# Patient Record
Sex: MALE | Race: BLACK OR AFRICAN AMERICAN | NOT HISPANIC OR LATINO | Employment: OTHER | URBAN - METROPOLITAN AREA
[De-identification: names, ages, dates, MRNs, and addresses within clinical notes are randomized per-mention and may not be internally consistent; named-entity substitution may affect disease eponyms.]

---

## 2017-12-28 ENCOUNTER — HOSPITAL ENCOUNTER (INPATIENT)
Facility: HOSPITAL | Age: 72
LOS: 1 days | Discharge: HOME | DRG: 291 | End: 2017-12-30
Attending: EMERGENCY MEDICINE | Admitting: HOSPITALIST
Payer: MEDICARE

## 2017-12-28 DIAGNOSIS — N18.6 ESRD (END STAGE RENAL DISEASE): ICD-10-CM

## 2017-12-28 DIAGNOSIS — I10 ESSENTIAL HYPERTENSION: Chronic | ICD-10-CM

## 2017-12-28 DIAGNOSIS — J10.1 INFLUENZA A: Primary | ICD-10-CM

## 2017-12-28 DIAGNOSIS — R06.02 SOB (SHORTNESS OF BREATH): ICD-10-CM

## 2017-12-28 DIAGNOSIS — I50.23 ACUTE ON CHRONIC SYSTOLIC HEART FAILURE: ICD-10-CM

## 2017-12-28 DIAGNOSIS — R09.02 HYPOXEMIA: ICD-10-CM

## 2017-12-28 DIAGNOSIS — I50.23 SYSTOLIC CHF, ACUTE ON CHRONIC: ICD-10-CM

## 2017-12-28 DIAGNOSIS — J90 PLEURAL EFFUSION, BILATERAL: ICD-10-CM

## 2017-12-28 LAB
ALBUMIN SERPL BCP-MCNC: 2.8 G/DL
ALP SERPL-CCNC: 67 U/L
ALT SERPL W/O P-5'-P-CCNC: 36 U/L
ANION GAP SERPL CALC-SCNC: 15 MMOL/L
AST SERPL-CCNC: 55 U/L
BASOPHILS # BLD AUTO: 0.03 K/UL
BASOPHILS NFR BLD: 0.4 %
BILIRUB SERPL-MCNC: 0.5 MG/DL
BNP SERPL-MCNC: 3654 PG/ML
BUN SERPL-MCNC: 29 MG/DL
CALCIUM SERPL-MCNC: 9.2 MG/DL
CHLORIDE SERPL-SCNC: 97 MMOL/L
CO2 SERPL-SCNC: 25 MMOL/L
CREAT SERPL-MCNC: 7.9 MG/DL
DIFFERENTIAL METHOD: ABNORMAL
EOSINOPHIL # BLD AUTO: 0 K/UL
EOSINOPHIL NFR BLD: 0.5 %
ERYTHROCYTE [DISTWIDTH] IN BLOOD BY AUTOMATED COUNT: 19.4 %
EST. GFR  (AFRICAN AMERICAN): 7 ML/MIN/1.73 M^2
EST. GFR  (NON AFRICAN AMERICAN): 6 ML/MIN/1.73 M^2
FLUAV AG SPEC QL IA: POSITIVE
FLUBV AG SPEC QL IA: NEGATIVE
GLUCOSE SERPL-MCNC: 181 MG/DL
HCT VFR BLD AUTO: 30.1 %
HGB BLD-MCNC: 9.6 G/DL
LYMPHOCYTES # BLD AUTO: 1.4 K/UL
LYMPHOCYTES NFR BLD: 19.1 %
MCH RBC QN AUTO: 29.9 PG
MCHC RBC AUTO-ENTMCNC: 31.9 G/DL
MCV RBC AUTO: 94 FL
MONOCYTES # BLD AUTO: 0.5 K/UL
MONOCYTES NFR BLD: 7.1 %
NEUTROPHILS # BLD AUTO: 5.4 K/UL
NEUTROPHILS NFR BLD: 72.9 %
PLATELET # BLD AUTO: 158 K/UL
PMV BLD AUTO: 10.8 FL
POTASSIUM SERPL-SCNC: 3.8 MMOL/L
PROT SERPL-MCNC: 7.1 G/DL
RBC # BLD AUTO: 3.21 M/UL
SODIUM SERPL-SCNC: 137 MMOL/L
SPECIMEN SOURCE: ABNORMAL
TROPONIN I SERPL DL<=0.01 NG/ML-MCNC: 0.9 NG/ML
WBC # BLD AUTO: 7.44 K/UL

## 2017-12-28 PROCEDURE — 83880 ASSAY OF NATRIURETIC PEPTIDE: CPT

## 2017-12-28 PROCEDURE — 84484 ASSAY OF TROPONIN QUANT: CPT

## 2017-12-28 PROCEDURE — 99284 EMERGENCY DEPT VISIT MOD MDM: CPT | Mod: 25

## 2017-12-28 PROCEDURE — G0378 HOSPITAL OBSERVATION PER HR: HCPCS

## 2017-12-28 PROCEDURE — 94640 AIRWAY INHALATION TREATMENT: CPT

## 2017-12-28 PROCEDURE — 87400 INFLUENZA A/B EACH AG IA: CPT | Mod: 59

## 2017-12-28 PROCEDURE — 93010 ELECTROCARDIOGRAM REPORT: CPT | Mod: ,,, | Performed by: INTERNAL MEDICINE

## 2017-12-28 PROCEDURE — 80053 COMPREHEN METABOLIC PANEL: CPT

## 2017-12-28 PROCEDURE — 93005 ELECTROCARDIOGRAM TRACING: CPT

## 2017-12-28 PROCEDURE — 25000003 PHARM REV CODE 250: Performed by: EMERGENCY MEDICINE

## 2017-12-28 PROCEDURE — 25000242 PHARM REV CODE 250 ALT 637 W/ HCPCS: Performed by: EMERGENCY MEDICINE

## 2017-12-28 PROCEDURE — 85025 COMPLETE CBC W/AUTO DIFF WBC: CPT

## 2017-12-28 RX ORDER — OSELTAMIVIR PHOSPHATE 75 MG/1
75 CAPSULE ORAL
Status: COMPLETED | OUTPATIENT
Start: 2017-12-28 | End: 2017-12-29

## 2017-12-28 RX ORDER — ACETAMINOPHEN 500 MG
1000 TABLET ORAL
Status: COMPLETED | OUTPATIENT
Start: 2017-12-28 | End: 2017-12-28

## 2017-12-28 RX ORDER — IPRATROPIUM BROMIDE AND ALBUTEROL SULFATE 2.5; .5 MG/3ML; MG/3ML
3 SOLUTION RESPIRATORY (INHALATION)
Status: COMPLETED | OUTPATIENT
Start: 2017-12-28 | End: 2017-12-28

## 2017-12-28 RX ADMIN — ACETAMINOPHEN 1000 MG: 500 TABLET, FILM COATED ORAL at 10:12

## 2017-12-28 RX ADMIN — IPRATROPIUM BROMIDE AND ALBUTEROL SULFATE 3 ML: .5; 3 SOLUTION RESPIRATORY (INHALATION) at 09:12

## 2017-12-29 PROBLEM — J18.9 PNEUMONIA: Status: ACTIVE | Noted: 2017-12-29

## 2017-12-29 PROBLEM — I50.23 ACUTE ON CHRONIC SYSTOLIC HEART FAILURE: Status: ACTIVE | Noted: 2017-12-28

## 2017-12-29 PROBLEM — I10 ESSENTIAL HYPERTENSION: Chronic | Status: ACTIVE | Noted: 2017-12-29

## 2017-12-29 PROBLEM — N18.6 ESRD (END STAGE RENAL DISEASE): Chronic | Status: ACTIVE | Noted: 2017-12-29

## 2017-12-29 PROBLEM — R06.03 ACUTE RESPIRATORY DISTRESS: Status: ACTIVE | Noted: 2017-12-28

## 2017-12-29 PROBLEM — J96.01 ACUTE RESPIRATORY FAILURE WITH HYPOXIA: Status: ACTIVE | Noted: 2017-12-29

## 2017-12-29 LAB
ANION GAP SERPL CALC-SCNC: 12 MMOL/L
BASOPHILS # BLD AUTO: 0.02 K/UL
BASOPHILS NFR BLD: 0.3 %
BUN SERPL-MCNC: 31 MG/DL
CALCIUM SERPL-MCNC: 8.9 MG/DL
CHLORIDE SERPL-SCNC: 97 MMOL/L
CO2 SERPL-SCNC: 29 MMOL/L
CREAT SERPL-MCNC: 8.4 MG/DL
DIFFERENTIAL METHOD: ABNORMAL
EOSINOPHIL # BLD AUTO: 0.1 K/UL
EOSINOPHIL NFR BLD: 1 %
ERYTHROCYTE [DISTWIDTH] IN BLOOD BY AUTOMATED COUNT: 19.2 %
EST. GFR  (AFRICAN AMERICAN): 7 ML/MIN/1.73 M^2
EST. GFR  (NON AFRICAN AMERICAN): 6 ML/MIN/1.73 M^2
ESTIMATED AVG GLUCOSE: 209 MG/DL
GLUCOSE SERPL-MCNC: 208 MG/DL
HBA1C MFR BLD HPLC: 8.9 %
HCT VFR BLD AUTO: 27.6 %
HGB BLD-MCNC: 8.6 G/DL
LACTATE SERPL-SCNC: 1.8 MMOL/L
LYMPHOCYTES # BLD AUTO: 1.2 K/UL
LYMPHOCYTES NFR BLD: 20.9 %
MAGNESIUM SERPL-MCNC: 2 MG/DL
MCH RBC QN AUTO: 29.5 PG
MCHC RBC AUTO-ENTMCNC: 31.2 G/DL
MCV RBC AUTO: 95 FL
MONOCYTES # BLD AUTO: 0.4 K/UL
MONOCYTES NFR BLD: 6 %
NEUTROPHILS # BLD AUTO: 4.2 K/UL
NEUTROPHILS NFR BLD: 71.8 %
PHOSPHATE SERPL-MCNC: 4.5 MG/DL
PLATELET # BLD AUTO: 153 K/UL
PMV BLD AUTO: 10.9 FL
POCT GLUCOSE: 207 MG/DL (ref 70–110)
POCT GLUCOSE: 214 MG/DL (ref 70–110)
POCT GLUCOSE: 235 MG/DL (ref 70–110)
POCT GLUCOSE: 289 MG/DL (ref 70–110)
POTASSIUM SERPL-SCNC: 3.7 MMOL/L
RBC # BLD AUTO: 2.92 M/UL
SODIUM SERPL-SCNC: 138 MMOL/L
TROPONIN I SERPL DL<=0.01 NG/ML-MCNC: 0.97 NG/ML
WBC # BLD AUTO: 5.88 K/UL

## 2017-12-29 PROCEDURE — 85025 COMPLETE CBC W/AUTO DIFF WBC: CPT

## 2017-12-29 PROCEDURE — 90937 HEMODIALYSIS REPEATED EVAL: CPT | Mod: ,,, | Performed by: INTERNAL MEDICINE

## 2017-12-29 PROCEDURE — 83605 ASSAY OF LACTIC ACID: CPT

## 2017-12-29 PROCEDURE — 84484 ASSAY OF TROPONIN QUANT: CPT

## 2017-12-29 PROCEDURE — 63600175 PHARM REV CODE 636 W HCPCS: Performed by: NURSE PRACTITIONER

## 2017-12-29 PROCEDURE — 25000003 PHARM REV CODE 250: Performed by: EMERGENCY MEDICINE

## 2017-12-29 PROCEDURE — 63600175 PHARM REV CODE 636 W HCPCS: Performed by: INTERNAL MEDICINE

## 2017-12-29 PROCEDURE — 80048 BASIC METABOLIC PNL TOTAL CA: CPT

## 2017-12-29 PROCEDURE — 25000003 PHARM REV CODE 250: Performed by: INTERNAL MEDICINE

## 2017-12-29 PROCEDURE — 25000003 PHARM REV CODE 250: Performed by: NURSE PRACTITIONER

## 2017-12-29 PROCEDURE — 36415 COLL VENOUS BLD VENIPUNCTURE: CPT

## 2017-12-29 PROCEDURE — C8929 TTE W OR WO FOL WCON,DOPPLER: HCPCS

## 2017-12-29 PROCEDURE — 80100016 HC MAINTENANCE HEMODIALYSIS

## 2017-12-29 PROCEDURE — 93306 TTE W/DOPPLER COMPLETE: CPT | Mod: 26,,, | Performed by: INTERNAL MEDICINE

## 2017-12-29 PROCEDURE — 87040 BLOOD CULTURE FOR BACTERIA: CPT | Mod: 59

## 2017-12-29 PROCEDURE — 84100 ASSAY OF PHOSPHORUS: CPT

## 2017-12-29 PROCEDURE — 83735 ASSAY OF MAGNESIUM: CPT

## 2017-12-29 PROCEDURE — 99223 1ST HOSP IP/OBS HIGH 75: CPT | Mod: 25,,, | Performed by: INTERNAL MEDICINE

## 2017-12-29 PROCEDURE — 94640 AIRWAY INHALATION TREATMENT: CPT

## 2017-12-29 PROCEDURE — 11000001 HC ACUTE MED/SURG PRIVATE ROOM

## 2017-12-29 PROCEDURE — 83036 HEMOGLOBIN GLYCOSYLATED A1C: CPT

## 2017-12-29 RX ORDER — FUROSEMIDE 10 MG/ML
60 INJECTION INTRAMUSCULAR; INTRAVENOUS ONCE
Status: COMPLETED | OUTPATIENT
Start: 2017-12-29 | End: 2017-12-29

## 2017-12-29 RX ORDER — ATORVASTATIN CALCIUM 20 MG/1
20 TABLET, FILM COATED ORAL DAILY
COMMUNITY

## 2017-12-29 RX ORDER — NAPROXEN SODIUM 220 MG/1
81 TABLET, FILM COATED ORAL DAILY
COMMUNITY

## 2017-12-29 RX ORDER — IBUPROFEN 200 MG
16 TABLET ORAL
Status: DISCONTINUED | OUTPATIENT
Start: 2017-12-29 | End: 2017-12-30 | Stop reason: HOSPADM

## 2017-12-29 RX ORDER — CINACALCET 60 MG/1
60 TABLET, FILM COATED ORAL
COMMUNITY
End: 2018-07-30 | Stop reason: SDUPTHER

## 2017-12-29 RX ORDER — ACETAMINOPHEN 325 MG/1
650 TABLET ORAL EVERY 6 HOURS PRN
Status: DISCONTINUED | OUTPATIENT
Start: 2017-12-29 | End: 2017-12-30 | Stop reason: HOSPADM

## 2017-12-29 RX ORDER — HEPARIN SODIUM 5000 [USP'U]/ML
5000 INJECTION, SOLUTION INTRAVENOUS; SUBCUTANEOUS EVERY 8 HOURS
Status: DISCONTINUED | OUTPATIENT
Start: 2017-12-29 | End: 2017-12-30 | Stop reason: HOSPADM

## 2017-12-29 RX ORDER — FUROSEMIDE 10 MG/ML
60 INJECTION INTRAMUSCULAR; INTRAVENOUS 2 TIMES DAILY
Status: DISCONTINUED | OUTPATIENT
Start: 2017-12-29 | End: 2017-12-29

## 2017-12-29 RX ORDER — IPRATROPIUM BROMIDE AND ALBUTEROL SULFATE 2.5; .5 MG/3ML; MG/3ML
3 SOLUTION RESPIRATORY (INHALATION) EVERY 8 HOURS
Status: COMPLETED | OUTPATIENT
Start: 2017-12-30 | End: 2017-12-29

## 2017-12-29 RX ORDER — GLUCAGON 1 MG
1 KIT INJECTION
Status: DISCONTINUED | OUTPATIENT
Start: 2017-12-29 | End: 2017-12-30 | Stop reason: HOSPADM

## 2017-12-29 RX ORDER — PANTOPRAZOLE SODIUM 40 MG/1
40 TABLET, DELAYED RELEASE ORAL DAILY
Status: DISCONTINUED | OUTPATIENT
Start: 2017-12-29 | End: 2017-12-30 | Stop reason: HOSPADM

## 2017-12-29 RX ORDER — PROMETHAZINE HYDROCHLORIDE AND CODEINE PHOSPHATE 6.25; 1 MG/5ML; MG/5ML
5 SOLUTION ORAL EVERY 8 HOURS PRN
Status: DISCONTINUED | OUTPATIENT
Start: 2017-12-29 | End: 2017-12-30 | Stop reason: HOSPADM

## 2017-12-29 RX ORDER — IBUPROFEN 200 MG
24 TABLET ORAL
Status: DISCONTINUED | OUTPATIENT
Start: 2017-12-29 | End: 2017-12-30 | Stop reason: HOSPADM

## 2017-12-29 RX ORDER — DIPHENHYDRAMINE HCL 25 MG
25 CAPSULE ORAL EVERY 6 HOURS PRN
Status: DISCONTINUED | OUTPATIENT
Start: 2017-12-29 | End: 2017-12-29

## 2017-12-29 RX ORDER — NAPROXEN SODIUM 220 MG/1
81 TABLET, FILM COATED ORAL DAILY
Status: DISCONTINUED | OUTPATIENT
Start: 2017-12-29 | End: 2017-12-30 | Stop reason: HOSPADM

## 2017-12-29 RX ORDER — HEPARIN SODIUM 1000 [USP'U]/ML
3000 INJECTION, SOLUTION INTRAVENOUS; SUBCUTANEOUS ONCE
Status: COMPLETED | OUTPATIENT
Start: 2017-12-29 | End: 2017-12-29

## 2017-12-29 RX ORDER — ALBUMIN HUMAN 250 G/1000ML
12.5 SOLUTION INTRAVENOUS
Status: DISCONTINUED | OUTPATIENT
Start: 2017-12-29 | End: 2017-12-30 | Stop reason: HOSPADM

## 2017-12-29 RX ORDER — CALCITRIOL 0.25 UG/1
0.25 CAPSULE ORAL DAILY
Status: DISCONTINUED | OUTPATIENT
Start: 2017-12-29 | End: 2017-12-30 | Stop reason: HOSPADM

## 2017-12-29 RX ORDER — OSELTAMIVIR PHOSPHATE 75 MG/1
75 CAPSULE ORAL DAILY
Status: DISCONTINUED | OUTPATIENT
Start: 2017-12-30 | End: 2017-12-30 | Stop reason: HOSPADM

## 2017-12-29 RX ORDER — FUROSEMIDE 40 MG/1
40 TABLET ORAL 2 TIMES DAILY
COMMUNITY

## 2017-12-29 RX ORDER — TEMAZEPAM 30 MG/1
30 CAPSULE ORAL NIGHTLY PRN
COMMUNITY

## 2017-12-29 RX ORDER — RAMELTEON 8 MG/1
8 TABLET ORAL NIGHTLY PRN
Status: DISCONTINUED | OUTPATIENT
Start: 2017-12-29 | End: 2017-12-30 | Stop reason: HOSPADM

## 2017-12-29 RX ORDER — CALCITRIOL 0.25 UG/1
0.25 CAPSULE ORAL DAILY
COMMUNITY

## 2017-12-29 RX ORDER — METOPROLOL SUCCINATE 25 MG/1
25 TABLET, EXTENDED RELEASE ORAL DAILY
Status: DISCONTINUED | OUTPATIENT
Start: 2017-12-29 | End: 2017-12-30 | Stop reason: HOSPADM

## 2017-12-29 RX ORDER — METOPROLOL SUCCINATE 25 MG/1
25 TABLET, EXTENDED RELEASE ORAL DAILY
COMMUNITY

## 2017-12-29 RX ORDER — SEVELAMER CARBONATE 800 MG/1
1600 TABLET, FILM COATED ORAL ONCE
Status: COMPLETED | OUTPATIENT
Start: 2017-12-29 | End: 2017-12-29

## 2017-12-29 RX ORDER — ATORVASTATIN CALCIUM 10 MG/1
20 TABLET, FILM COATED ORAL NIGHTLY
Status: DISCONTINUED | OUTPATIENT
Start: 2017-12-29 | End: 2017-12-30 | Stop reason: HOSPADM

## 2017-12-29 RX ORDER — FLUTICASONE PROPIONATE 50 MCG
1 SPRAY, SUSPENSION (ML) NASAL DAILY
COMMUNITY

## 2017-12-29 RX ORDER — FUROSEMIDE 40 MG/1
40 TABLET ORAL 2 TIMES DAILY
Status: DISCONTINUED | OUTPATIENT
Start: 2017-12-29 | End: 2017-12-29

## 2017-12-29 RX ORDER — CINACALCET 30 MG/1
60 TABLET, FILM COATED ORAL
Status: DISCONTINUED | OUTPATIENT
Start: 2017-12-29 | End: 2017-12-30 | Stop reason: HOSPADM

## 2017-12-29 RX ORDER — MIDODRINE HYDROCHLORIDE 5 MG/1
2.5 TABLET ORAL 2 TIMES DAILY WITH MEALS
COMMUNITY

## 2017-12-29 RX ORDER — PANTOPRAZOLE SODIUM 40 MG/1
40 TABLET, DELAYED RELEASE ORAL DAILY
COMMUNITY

## 2017-12-29 RX ORDER — SEVELAMER HYDROCHLORIDE 800 MG/1
800 TABLET, FILM COATED ORAL
COMMUNITY

## 2017-12-29 RX ORDER — DIPHENHYDRAMINE HCL 50 MG
50 CAPSULE ORAL NIGHTLY PRN
Status: DISCONTINUED | OUTPATIENT
Start: 2017-12-29 | End: 2017-12-30 | Stop reason: HOSPADM

## 2017-12-29 RX ORDER — LISINOPRIL 5 MG/1
5 TABLET ORAL DAILY
Status: DISCONTINUED | OUTPATIENT
Start: 2017-12-29 | End: 2017-12-30 | Stop reason: HOSPADM

## 2017-12-29 RX ORDER — SEVELAMER CARBONATE 800 MG/1
800 TABLET, FILM COATED ORAL
Status: DISCONTINUED | OUTPATIENT
Start: 2017-12-29 | End: 2017-12-30 | Stop reason: HOSPADM

## 2017-12-29 RX ORDER — ONDANSETRON 2 MG/ML
4 INJECTION INTRAMUSCULAR; INTRAVENOUS EVERY 6 HOURS PRN
Status: DISCONTINUED | OUTPATIENT
Start: 2017-12-29 | End: 2017-12-30 | Stop reason: HOSPADM

## 2017-12-29 RX ORDER — AZITHROMYCIN 250 MG/1
500 TABLET, FILM COATED ORAL DAILY
Status: DISCONTINUED | OUTPATIENT
Start: 2017-12-29 | End: 2017-12-29

## 2017-12-29 RX ORDER — INSULIN ASPART 100 [IU]/ML
0-5 INJECTION, SOLUTION INTRAVENOUS; SUBCUTANEOUS
Status: DISCONTINUED | OUTPATIENT
Start: 2017-12-29 | End: 2017-12-30 | Stop reason: HOSPADM

## 2017-12-29 RX ADMIN — ERYTHROPOIETIN 10000 UNITS: 10000 INJECTION, SOLUTION INTRAVENOUS; SUBCUTANEOUS at 06:12

## 2017-12-29 RX ADMIN — ATORVASTATIN CALCIUM 20 MG: 10 TABLET, FILM COATED ORAL at 10:12

## 2017-12-29 RX ADMIN — SEVELAMER CARBONATE 800 MG: 800 TABLET, FILM COATED ORAL at 12:12

## 2017-12-29 RX ADMIN — SEVELAMER CARBONATE 800 MG: 800 TABLET, FILM COATED ORAL at 05:12

## 2017-12-29 RX ADMIN — HEPARIN SODIUM 3000 UNITS: 1000 INJECTION, SOLUTION INTRAVENOUS; SUBCUTANEOUS at 06:12

## 2017-12-29 RX ADMIN — AZITHROMYCIN MONOHYDRATE 500 MG: 500 INJECTION, POWDER, LYOPHILIZED, FOR SOLUTION INTRAVENOUS at 11:12

## 2017-12-29 RX ADMIN — INSULIN ASPART 3 UNITS: 100 INJECTION, SOLUTION INTRAVENOUS; SUBCUTANEOUS at 10:12

## 2017-12-29 RX ADMIN — SEVELAMER CARBONATE 800 MG: 800 TABLET, FILM COATED ORAL at 08:12

## 2017-12-29 RX ADMIN — CEFTRIAXONE SODIUM 1 G: 1 INJECTION, POWDER, FOR SOLUTION INTRAMUSCULAR; INTRAVENOUS at 11:12

## 2017-12-29 RX ADMIN — INSULIN ASPART 2 UNITS: 100 INJECTION, SOLUTION INTRAVENOUS; SUBCUTANEOUS at 05:12

## 2017-12-29 RX ADMIN — HEPARIN SODIUM 5000 UNITS: 5000 INJECTION, SOLUTION INTRAVENOUS; SUBCUTANEOUS at 05:12

## 2017-12-29 RX ADMIN — IPRATROPIUM BROMIDE AND ALBUTEROL SULFATE 3 ML: .5; 3 SOLUTION RESPIRATORY (INHALATION) at 11:12

## 2017-12-29 RX ADMIN — CINACALCET HYDROCHLORIDE 60 MG: 30 TABLET, COATED ORAL at 08:12

## 2017-12-29 RX ADMIN — INSULIN ASPART 1 UNITS: 100 INJECTION, SOLUTION INTRAVENOUS; SUBCUTANEOUS at 02:12

## 2017-12-29 RX ADMIN — SEVELAMER CARBONATE 1600 MG: 800 TABLET, FILM COATED ORAL at 10:12

## 2017-12-29 RX ADMIN — DIPHENHYDRAMINE HYDROCHLORIDE 50 MG: 50 CAPSULE ORAL at 10:12

## 2017-12-29 RX ADMIN — OSELTAMIVIR PHOSPHATE 75 MG: 75 CAPSULE ORAL at 12:12

## 2017-12-29 RX ADMIN — CALCITRIOL 0.25 MCG: 0.25 CAPSULE, LIQUID FILLED ORAL at 08:12

## 2017-12-29 RX ADMIN — FUROSEMIDE 60 MG: 10 INJECTION, SOLUTION INTRAMUSCULAR; INTRAVENOUS at 02:12

## 2017-12-29 RX ADMIN — LISINOPRIL 5 MG: 5 TABLET ORAL at 05:12

## 2017-12-29 RX ADMIN — PANTOPRAZOLE SODIUM 40 MG: 40 TABLET, DELAYED RELEASE ORAL at 02:12

## 2017-12-29 RX ADMIN — ATORVASTATIN CALCIUM 20 MG: 10 TABLET, FILM COATED ORAL at 02:12

## 2017-12-29 RX ADMIN — GUAIFENESIN AND DEXTROMETHORPHAN HYDROBROMIDE 1 TABLET: 600; 30 TABLET, EXTENDED RELEASE ORAL at 08:12

## 2017-12-29 RX ADMIN — GUAIFENESIN AND DEXTROMETHORPHAN HYDROBROMIDE 1 TABLET: 600; 30 TABLET, EXTENDED RELEASE ORAL at 02:12

## 2017-12-29 RX ADMIN — GUAIFENESIN AND DEXTROMETHORPHAN HYDROBROMIDE 1 TABLET: 600; 30 TABLET, EXTENDED RELEASE ORAL at 10:12

## 2017-12-29 RX ADMIN — RAMELTEON 8 MG: 8 TABLET, FILM COATED ORAL at 10:12

## 2017-12-29 RX ADMIN — METOPROLOL SUCCINATE 25 MG: 25 TABLET, EXTENDED RELEASE ORAL at 02:12

## 2017-12-29 NOTE — PROGRESS NOTES
[facility] Hospital Medicine  Progress Note    Patient Name: James Kennedy  MRN: [id number]  Patient Class: IP- Inpatient   Admission Date: 12/28/2017  Length of Stay: 0 days  Attending Physician: Ashish Barry MD  Primary Care Provider: [facility]        Subjective:     Principal Problem:Acute on chronic systolic heart failure    HPI:  James Kennedy is a 72 y.o. male patient with PMHx of CHF who presents to the Emergency Department for SOB which onset gradually 2 weeks ago. Patient reports being [facility] Z[facility] on [facility]/17 for URI sxs. Symptoms are constant and moderate in severity. No mitigating or exacerbating factors reported. Associated sxs include chest tightness, fever, and chills. Patient reports fever and chills onset yesterday after dialysis. Patient reports having dialysis on Mondays, Wednesdays, and Fridays. Patient's nephrologist is Dr. Rea. Patient denies any CP, diaphoresis, palpitations, extremity weakness/numbness, leg swelling, abd pain, hematochezia, dizziness, cough, N/V, and all other sxs at this time. Patient reports hx of using cigarettes, but states he has not smoked in the last 3 weeks.  Initial work-up in ED resulted cr. 7.9, BUN 29, K 3.8, BNP 3654, troponin 0.899, influenza A positive.  CXR showed pulmonary vascular congestion, small to moderate right and small left pleural effusions.  Hospital Medicine was consulted for admission.    Hospital Course:  The pt was admitted with Influenza A, bilateral pneumonia, and decompensated systolic CHF EF 20%. Pt ws placed on Tamiflu, Iv antibiotics. He was given IV lasix and will receive ultrafiltration with HD. Pt has been compliant with meds and HD treatments. Care discussed with nephrology. Pt feels much more SOB than baseline- unable to recline past 45 degrees. Fever resolved. WBC normal.         Review of Systems   Constitutional: Positive for fatigue. Negative for appetite  change, chills, diaphoresis and fever.   HENT: Negative for congestion, nosebleeds, sore throat and trouble swallowing.    Eyes: Negative for pain, discharge and visual disturbance.   Respiratory: Positive for cough, chest tightness, shortness of breath and wheezing. Negative for apnea and stridor.         Orthopnea    Cardiovascular: Negative for chest pain, palpitations and leg swelling.   Gastrointestinal: Negative for abdominal distention, abdominal pain, blood in stool, constipation, diarrhea, nausea and vomiting.   Endocrine: Negative for cold intolerance and heat intolerance.   Genitourinary: Negative for difficulty urinating, dysuria, flank pain, frequency and urgency.   Musculoskeletal: Negative for arthralgias, back pain, joint swelling, myalgias, neck pain and neck stiffness.   Skin: Negative for rash and wound.   Allergic/Immunologic: Negative for food allergies and immunocompromised state.   Neurological: Negative for dizziness, seizures, syncope, facial asymmetry, weakness, light-headedness and headaches.   Hematological: Negative for adenopathy.   Psychiatric/Behavioral: Negative for agitation, behavioral problems and confusion. The patient is not nervous/anxious.      Objective:     Vital Signs (Most Recent):  Temp: 98.8 °F (37.1 °C) (12/29/17 1521)  Pulse: 80 (12/29/17 1521)  Resp: 18 (12/29/17 1521)  BP: 124/72 (12/29/17 1521)  SpO2: 99 % (12/29/17 1521) Vital Signs (24h Range):  Temp:  [97.9 °F (36.6 °C)-102.2 °F (39 °C)] 98.8 °F (37.1 °C)  Pulse:  [] 80  Resp:  [18-30] 18  SpO2:  [92 %-100 %] 99 %  BP: (110-163)/(56-75) 124/72     Weight: 99.8 kg (220 lb)  Body mass index is 34.46 kg/m².  No intake or output data in the 24 hours ending 12/29/17 1740   Physical Exam   Constitutional: He is oriented to person, place, and time. No distress.   Frail, elderly    HENT:   Head: Normocephalic and atraumatic.   Nose: Nose normal.   Mouth/Throat: Oropharynx is clear and moist.   Eyes: Conjunctivae  are normal. No scleral icterus.   Neck: Normal range of motion. Neck supple. JVD present.   Cardiovascular: Normal rate, regular rhythm, normal heart sounds and intact distal pulses.  Exam reveals no gallop and no friction rub.    No murmur heard.  Pulmonary/Chest: Effort normal. No stridor. No respiratory distress. He has wheezes. He has rales. He exhibits no tenderness.   Crackles bilaterally   Conversational dyspnea    Abdominal: Soft. Bowel sounds are normal. He exhibits no distension. There is no tenderness. There is no rebound and no guarding.   Musculoskeletal: Normal range of motion. He exhibits no edema, tenderness or deformity.   Neurological: He is alert and oriented to person, place, and time. He has normal reflexes. No cranial nerve deficit. He exhibits normal muscle tone. Coordination normal.   Skin: Skin is warm and dry. No rash noted. He is not diaphoretic. No erythema. No pallor.   Psychiatric: He has a normal mood and affect. His behavior is normal. Thought content normal.   Nursing note and vitals reviewed.      Significant Labs:   CBC:   Recent Labs  Lab 12/28/17  2159 12/29/17  0424   WBC 7.44 5.88   HGB 9.6* 8.6*   HCT 30.1* 27.6*    153     CMP:   Recent Labs  Lab 12/28/17  2242 12/29/17  0424    138   K 3.8 3.7   CL 97 97   CO2 25 29   * 208*   BUN 29* 31*   CREATININE 7.9* 8.4*   CALCIUM 9.2 8.9   PROT 7.1  --    ALBUMIN 2.8*  --    BILITOT 0.5  --    ALKPHOS 67  --    AST 55*  --    ALT 36  --    ANIONGAP 15 12   EGFRNONAA 6* 6*       Significant Imaging:   Imaging Results          X-Ray Chest AP Portable (Final result)  Result time 12/28/17 22:26:58    Final result by Naz Perkins MD (12/28/17 22:26:58)                 Impression:     Small-to-moderate right and small left pleural effusions with bibasilar patchy opacities, probably atelectasis, although infiltrate not excluded.          Electronically signed by: NAZ PERKINS  Date:     12/28/17  Time:    22:26               Narrative:    Chest x-ray single view    Indication: Shortness of breath.    Findings: No prior study.  Status post cardiothoracic surgery with sternal wires.  Normal size heart.  No vascular congestion.  Bilateral pleural effusions, right greater than left, blunting the costophrenic angles extend along the lower chest sidewalls.  Patchy opacities bibasilar distributions of likely superimposed atelectasis.  Infiltrate not excluded.  Upper lungs are clear.                            Assessment/Plan:      * Acute on chronic systolic HFrEF of 20%     IV Lasix 60mg x 1 dose given  Care discussed with Nephrology- UF with  HD.  - Strict I&O's, daily weights, Na/fluid restriction.  - Supplemental O2 and Duonebs as needed.  - Continue BB for systolic dysfunction.  - Will add lisinopril         Acute respiratory failure with hypoxia    Oxygen  Home oxygen eval in am          Influenza A    - Tamiflu renal dose  - Supportive care.     Pneumonia  IV antibiotics  Neb txs  Blood and sputum culture pending      Essential hypertension    - BP stable.  - Continue home beta blocker.  - Monitor BP trends.        ESRD (end stage renal disease) on HD    - Receives HD M/W/F.  HD for UF  Monitor BMP          VTE Risk Mitigation         Ordered     heparin (porcine) injection 3,000 Units  Once     Route:  Intravenous        12/29/17 0856     heparin (porcine) injection 5,000 Units  Every 8 hours     Route:  Subcutaneous        12/29/17 0149     Medium Risk of VTE  Once      12/29/17 0106     Place sequential compression device  Until discontinued      12/29/17 0106              Susan Lima NP  Department of Hospital Medicine   [facility]

## 2017-12-29 NOTE — CONSULTS
[facility]   Nephrology  Consult Note    Patient Name: James Kennedy  MRN: [id number]  Admission Date: 12/28/2017  Hospital Length of Stay: 0 days  Attending Provider: Ashish Barry MD   Primary Care Physician: [facility]  Principal Problem:Acute on chronic systolic heart failure. ESRD on HD    Consults  Subjective:     HPI: Pt was seen and examined. H/p was reviewed. Pt is a 71 y/o male with ESRD on chronic HD q MWF who was admitted with SOB since yesterday and subjective fever. Pt is positive for influenza A. But he also has evidence of fluid gain due to CHD exacerbation. Outside echo shows EF of 20%. HD orders discussed with HD nurse. Mgmt reviewed with PCP. Pt has been on HD x 4 years, and usually does not miss HD.    Past Medical History:   Diagnosis Date    CHF (congestive heart failure)     GERD (gastroesophageal reflux disease)     Hypertension     MI, old     Renal disorder        Past Surgical History:   Procedure Laterality Date    BYPASS GRAFT      CARDIAC SURGERY      CHOLECYSTECTOMY      DIALYSIS FISTULA CREATION         Review of patient's allergies indicates:  No Known Allergies  Current Facility-Administered Medications   Medication Frequency    acetaminophen tablet 650 mg Q6H PRN    albumin human 25% bottle 12.5 g PRN    aspirin chewable tablet 81 mg Daily    atorvastatin tablet 20 mg QHS    azithromycin 500 mg in dextrose 5 % 250 mL IVPB (ready to mix system) Q24H    calcitRIOL capsule 0.25 mcg Daily    cefTRIAXone (ROCEPHIN) 1 g in dextrose 5 % 50 mL IVPB Q24H    cinacalcet tablet 60 mg Daily with breakfast    dextromethorphan-guaifenesin  mg per 12 hr tablet 1 tablet BID    dextrose 50% injection 12.5 g PRN    dextrose 50% injection 25 g PRN    diphenhydrAMINE capsule 25 mg Q6H PRN    epoetin lorraine injection 10,000 Units Once    glucagon (human recombinant) injection 1 mg PRN    glucose chewable tablet 16 g PRN    glucose  chewable tablet 24 g PRN    heparin (porcine) injection 3,000 Units Once    heparin (porcine) injection 5,000 Units Q8H    insulin aspart pen 0-5 Units QID (AC + HS) PRN    metoprolol succinate (TOPROL-XL) 24 hr tablet 25 mg Daily    ondansetron injection 4 mg Q6H PRN    [START ON 12/30/2017] oseltamivir capsule 75 mg Daily    pantoprazole EC tablet 40 mg Daily    promethazine-codeine 6.25-10 mg/5 ml syrup 5 mL Q8H PRN    ramelteon tablet 8 mg Nightly PRN    sevelamer carbonate tablet 800 mg TID WM     Family History     None        Social History Main Topics    Smoking status: Former Smoker    Smokeless tobacco: Not on file    Alcohol use No    Drug use: No    Sexual activity: Not on file     Review of Systems   Constitutional: Positive for activity change and fatigue. Negative for appetite change.   HENT: Negative.    Respiratory: Positive for shortness of breath.    Cardiovascular: Negative.    Gastrointestinal: Negative.    Genitourinary: Negative.    Musculoskeletal: Negative.    Neurological: Negative.    Psychiatric/Behavioral: Negative.      Objective:     Vital Signs (Most Recent):  Temp: 98.8 °F (37.1 °C) (12/29/17 1521)  Pulse: 80 (12/29/17 1521)  Resp: 18 (12/29/17 1521)  BP: 124/72 (12/29/17 1521)  SpO2: 99 % (12/29/17 1521)  O2 Device (Oxygen Therapy): nasal cannula (12/29/17 1521) Vital Signs (24h Range):  Temp:  [97.9 °F (36.6 °C)-102.2 °F (39 °C)] 98.8 °F (37.1 °C)  Pulse:  [] 80  Resp:  [18-30] 18  SpO2:  [92 %-100 %] 99 %  BP: (110-163)/(56-75) 124/72     Weight: 99.8 kg (220 lb) (12/29/17 0433)  Body mass index is 34.46 kg/m².  Body surface area is 2.17 meters squared.    No intake/output data recorded.    Physical Exam   Constitutional: He is oriented to person, place, and time. He appears well-developed and well-nourished.   HENT:   Head: Normocephalic and atraumatic.   Neck: No JVD present.   Cardiovascular: Normal rate, regular rhythm and normal heart sounds.  Exam  reveals no friction rub.    Pulmonary/Chest: Effort normal. He has rales.   Abdominal: Soft. Bowel sounds are normal.   Musculoskeletal: He exhibits edema.   Neurological: He is alert and oriented to person, place, and time.   Skin: Skin is warm and dry.   Psychiatric: He has a normal mood and affect. His behavior is normal.   Nursing note and vitals reviewed.      Significant Labs: reviewed  BMP  Lab Results   Component Value Date     12/29/2017    K 3.7 12/29/2017    CL 97 12/29/2017    CO2 29 12/29/2017    BUN 31 (H) 12/29/2017    CREATININE 8.4 (H) 12/29/2017    CALCIUM 8.9 12/29/2017    ANIONGAP 12 12/29/2017    ESTGFRAFRICA 7 (A) 12/29/2017    EGFRNONAA 6 (A) 12/29/2017     Lab Results   Component Value Date    WBC 5.88 12/29/2017    HGB 8.6 (L) 12/29/2017    HCT 27.6 (L) 12/29/2017    MCV 95 12/29/2017     12/29/2017     Significant Imaging: CXR reviewed    Assessment/Plan:   73 y/o male with ESRD on chronic HD was admitted for SOB:    ESRD (end stage renal disease) on HD    ESRD: chronic HD q MWF.  K normal  Acid base stable  Pt has fluid overload, pulmonary edema  Providing HD today for UF  Has been stable on HD, has not missed  SOB due to pulm edema  SOB is also due to influenza A       * Acute on chronic systolic HFrEF of 20%    Acute on chronic CHF.  Systolic CHF with EF 25%  Providing HD for UF today  Pt was advised on salt (2-3 g/day) and fluid restriction (1.5 L/day)                  Essential hypertension    HTN: BP controlled.  Meds reviewed        Influenza A    Positive influenza A.  On tamiflu: adjust dose to 75 mg po qd (not bid) for renal dose adjustment.        Plans and recommendations:  As discussed above  Total time spent 70 minutes including time needed to review the records, the   patient evaluation, documentation, face-to-face discussion with the patient,   more than 50% of the time was spent on coordination of care and counseling.    Level V visit.  Pt received multiple  visits and evaluations.      Ant Gerard MD  Nephrology  [facility]

## 2017-12-29 NOTE — ED PROVIDER NOTES
SCRIBE #1 NOTE: I, Mojgan Baker, am scribing for, and in the presence of, Johnathan Silver Jr., MD. I have scribed the entire note.      History      Chief Complaint   Patient presents with    Shortness of Breath     shorntess of breath, chest pain, coughing and wheezing; dialysis M/W/F       Review of patient's allergies indicates:  No Known Allergies     HPI   HPI    12/28/2017, 9:47 PM   History obtained from the patient      History of Present Illness: James Kennedy is a 72 y.o. male patient with PMHx of CHF who presents to the Emergency Department for SOB which onset gradually 2 weeks ago. Patient reports being [facility] for URI sxs. Symptoms are constant and moderate in severity. No mitigating or exacerbating factors reported. Associated sxs include chest tightness, fever, and chills. Patient reports fever and chills onset yesterday after dialysis. Patient reports having dialysis on Mondays, Wednesdays, and Fridays. Patient's nephrologist is Dr. Rea. Patient denies any CP, diaphoresis, palpitations, extremity weakness/numbness, leg swelling, abd pain, hematochezia, dizziness, cough, N/V, and all other sxs at this time. Patient reports hx of using cigarettes, but states he has not smoked in the last 3 weeks. No further complaints or concerns at this time.     Arrival mode: Personal vehicle     [facility]       Past Medical History:  Past Medical History:   Diagnosis Date    CHF (congestive heart failure)     GERD (gastroesophageal reflux disease)     Hypertension     MI, old     Renal disorder        Past Surgical History:  Past Surgical History:   Procedure Laterality Date    BYPASS GRAFT      CARDIAC SURGERY      CHOLECYSTECTOMY      DIALYSIS FISTULA CREATION           Family History:  History reviewed. No pertinent family history.    Social History:  Social History     Social History Main Topics    Smoking status: Former Smoker     Smokeless tobacco: Unknown    Alcohol use No    Drug use: No    Sexual activity: Unknown       ROS   Review of Systems   Constitutional: Positive for chills and fever. Negative for diaphoresis.   HENT: Negative for sore throat.    Respiratory: Positive for chest tightness and shortness of breath. Negative for cough.    Cardiovascular: Negative for chest pain, palpitations and leg swelling.   Gastrointestinal: Negative for abdominal pain, blood in stool, nausea and vomiting.   Genitourinary: Negative for dysuria.   Musculoskeletal: Negative for back pain.   Skin: Negative for rash.   Neurological: Negative for dizziness, weakness and numbness.   Hematological: Does not bruise/bleed easily.   All other systems reviewed and are negative.      Physical Exam      Initial Vitals [12/28/17 2124]   BP Pulse Resp Temp SpO2   (!) 163/75 100 (!) 24 (!) 102.2 °F (39 °C) (!) 92 %      MAP       104.33          Physical Exam  Nursing Notes and Vital Signs Reviewed.  Constitutional: Patient is in no acute distress. Well-developed and well-nourished.  Head: Atraumatic. Normocephalic.  Eyes: PERRL. EOM intact. Conjunctivae are not pale. No scleral icterus.  ENT: Mucous membranes are moist. Oropharynx is clear and symmetric.    Neck: Supple. Full ROM. No lymphadenopathy.  Cardiovascular: Regular rate. Regular rhythm. No murmurs, rubs, or gallops. Distal pulses are 2+ and symmetric.  Pulmonary/Chest: No respiratory distress. Crackles bilaterally. Mild wheezing bilaterally that has improved with breathing treatment. No rales.  Abdominal: Soft and non-distended.  There is no tenderness.  No rebound, guarding, or rigidity. Good bowel sounds.  Genitourinary: No CVA tenderness  Musculoskeletal: Moves all extremities. No obvious deformities. No edema. No calf tenderness.  Skin: Warm and dry.  Neurological:  Alert, awake, and appropriate.  Normal speech.  No acute focal neurological deficits are appreciated.  Psychiatric: Normal  affect. Good eye contact. Appropriate in content.    ED Course    Procedures  ED Vital Signs:  Vitals:    12/29/17 1930 12/29/17 2000 12/29/17 2004 12/29/17 2030   BP: (!) 104/56 110/62 115/65 (!) 105/59   Pulse: 78 82 78 78   Resp: 18 20 18 18   Temp:   99.9 °F (37.7 °C)    TempSrc:   Oral    SpO2:       Weight:       Height:        12/29/17 2100 12/29/17 2125 12/29/17 2130 12/29/17 2334   BP: (!) 115/57 118/69 90/66 131/60   Pulse: 82 65 88 94   Resp: 18 20 20 20   Temp:    100.2 °F (37.9 °C)   TempSrc:       SpO2:    95%   Weight:       Height:        12/29/17 2353 12/30/17 0339 12/30/17 0736 12/30/17 0912   BP:  (!) 110/56 125/62    Pulse: 94 78 76 70   Resp: 20 20 18    Temp:  98.9 °F (37.2 °C) 98.2 °F (36.8 °C)    TempSrc:   Oral    SpO2: 96% 96% 99% 99%   Weight:  101.6 kg (224 lb)     Height:        12/30/17 1105 12/30/17 1133 12/30/17 1309   BP:  136/64    Pulse: 72 74 70   Resp:  18    Temp:  98.6 °F (37 °C)    TempSrc:  Oral    SpO2:  98%    Weight:      Height:          Abnormal Lab Results:  Labs Reviewed   CBC W/ AUTO DIFFERENTIAL - Abnormal; Notable for the following:        Result Value    RBC 3.21 (*)     Hemoglobin 9.6 (*)     Hematocrit 30.1 (*)     MCHC 31.9 (*)     RDW 19.4 (*)     All other components within normal limits   B-TYPE NATRIURETIC PEPTIDE - Abnormal; Notable for the following:     BNP 3,654 (*)     All other components within normal limits   INFLUENZA A AND B ANTIGEN - Abnormal; Notable for the following:     Influenza A Ag, EIA Positive (*)     All other components within normal limits   COMPREHENSIVE METABOLIC PANEL - Abnormal; Notable for the following:     Glucose 181 (*)     BUN, Bld 29 (*)     Creatinine 7.9 (*)     Albumin 2.8 (*)     AST 55 (*)     eGFR if  7 (*)     eGFR if non  6 (*)     All other components within normal limits   TROPONIN I - Abnormal; Notable for the following:     Troponin I 0.899 (*)     All other components within  normal limits        All Lab Results:  Results for orders placed or performed during the hospital encounter of 12/28/17   Blood culture   Result Value Ref Range    Blood Culture, Routine No Growth to date     Blood Culture, Routine No Growth to date     Blood Culture, Routine No Growth to date     Blood Culture, Routine No Growth to date    Blood culture   Result Value Ref Range    Blood Culture, Routine No Growth to date     Blood Culture, Routine No Growth to date     Blood Culture, Routine No Growth to date     Blood Culture, Routine No Growth to date    Culture, Respiratory with Gram Stain   Result Value Ref Range    Respiratory Culture Normal respiratory quirino     Gram Stain (Respiratory) <10 epithelial cells per low power field.     Gram Stain (Respiratory) Few WBC's     Gram Stain (Respiratory) Moderate Gram positive cocci     Gram Stain (Respiratory) Rare Gram negative rods    CBC auto differential   Result Value Ref Range    WBC 7.44 3.90 - 12.70 K/uL    RBC 3.21 (L) 4.60 - 6.20 M/uL    Hemoglobin 9.6 (L) 14.0 - 18.0 g/dL    Hematocrit 30.1 (L) 40.0 - 54.0 %    MCV 94 82 - 98 fL    MCH 29.9 27.0 - 31.0 pg    MCHC 31.9 (L) 32.0 - 36.0 g/dL    RDW 19.4 (H) 11.5 - 14.5 %    Platelets 158 150 - 350 K/uL    MPV 10.8 9.2 - 12.9 fL    Gran # 5.4 1.8 - 7.7 K/uL    Lymph # 1.4 1.0 - 4.8 K/uL    Mono # 0.5 0.3 - 1.0 K/uL    Eos # 0.0 0.0 - 0.5 K/uL    Baso # 0.03 0.00 - 0.20 K/uL    Gran% 72.9 38.0 - 73.0 %    Lymph% 19.1 18.0 - 48.0 %    Mono% 7.1 4.0 - 15.0 %    Eosinophil% 0.5 0.0 - 8.0 %    Basophil% 0.4 0.0 - 1.9 %    Differential Method Automated    B-Type natriuretic peptide (BNP)   Result Value Ref Range    BNP 3,654 (H) 0 - 99 pg/mL   Influenza antigen Nasopharyngeal Swab   Result Value Ref Range    Influenza A Ag, EIA Positive (A) Negative    Influenza B Ag, EIA Negative Negative    Flu A & B Source Nasopharyngeal Swab    Comprehensive metabolic panel   Result Value Ref Range    Sodium 137 136 - 145 mmol/L     Potassium 3.8 3.5 - 5.1 mmol/L    Chloride 97 95 - 110 mmol/L    CO2 25 23 - 29 mmol/L    Glucose 181 (H) 70 - 110 mg/dL    BUN, Bld 29 (H) 8 - 23 mg/dL    Creatinine 7.9 (H) 0.5 - 1.4 mg/dL    Calcium 9.2 8.7 - 10.5 mg/dL    Total Protein 7.1 6.0 - 8.4 g/dL    Albumin 2.8 (L) 3.5 - 5.2 g/dL    Total Bilirubin 0.5 0.1 - 1.0 mg/dL    Alkaline Phosphatase 67 55 - 135 U/L    AST 55 (H) 10 - 40 U/L    ALT 36 10 - 44 U/L    Anion Gap 15 8 - 16 mmol/L    eGFR if African American 7 (A) >60 mL/min/1.73 m^2    eGFR if non African American 6 (A) >60 mL/min/1.73 m^2   Troponin I   Result Value Ref Range    Troponin I 0.899 (H) 0.000 - 0.026 ng/mL   CBC auto differential   Result Value Ref Range    WBC 5.88 3.90 - 12.70 K/uL    RBC 2.92 (L) 4.60 - 6.20 M/uL    Hemoglobin 8.6 (L) 14.0 - 18.0 g/dL    Hematocrit 27.6 (L) 40.0 - 54.0 %    MCV 95 82 - 98 fL    MCH 29.5 27.0 - 31.0 pg    MCHC 31.2 (L) 32.0 - 36.0 g/dL    RDW 19.2 (H) 11.5 - 14.5 %    Platelets 153 150 - 350 K/uL    MPV 10.9 9.2 - 12.9 fL    Gran # 4.2 1.8 - 7.7 K/uL    Lymph # 1.2 1.0 - 4.8 K/uL    Mono # 0.4 0.3 - 1.0 K/uL    Eos # 0.1 0.0 - 0.5 K/uL    Baso # 0.02 0.00 - 0.20 K/uL    Gran% 71.8 38.0 - 73.0 %    Lymph% 20.9 18.0 - 48.0 %    Mono% 6.0 4.0 - 15.0 %    Eosinophil% 1.0 0.0 - 8.0 %    Basophil% 0.3 0.0 - 1.9 %    Differential Method Automated    Basic metabolic panel   Result Value Ref Range    Sodium 138 136 - 145 mmol/L    Potassium 3.7 3.5 - 5.1 mmol/L    Chloride 97 95 - 110 mmol/L    CO2 29 23 - 29 mmol/L    Glucose 208 (H) 70 - 110 mg/dL    BUN, Bld 31 (H) 8 - 23 mg/dL    Creatinine 8.4 (H) 0.5 - 1.4 mg/dL    Calcium 8.9 8.7 - 10.5 mg/dL    Anion Gap 12 8 - 16 mmol/L    eGFR if African American 7 (A) >60 mL/min/1.73 m^2    eGFR if non African American 6 (A) >60 mL/min/1.73 m^2   Magnesium   Result Value Ref Range    Magnesium 2.0 1.6 - 2.6 mg/dL   Phosphorus   Result Value Ref Range    Phosphorus 4.5 2.7 - 4.5 mg/dL   Hemoglobin A1c   Result  Value Ref Range    Hemoglobin A1C 8.9 (H) 4.0 - 5.6 %    Estimated Avg Glucose 209 (H) 68 - 131 mg/dL   Troponin I   Result Value Ref Range    Troponin I 0.968 (H) 0.000 - 0.026 ng/mL   Lactic acid, plasma   Result Value Ref Range    Lactate (Lactic Acid) 1.8 0.5 - 2.2 mmol/L   CBC auto differential   Result Value Ref Range    WBC 5.91 3.90 - 12.70 K/uL    RBC 2.95 (L) 4.60 - 6.20 M/uL    Hemoglobin 8.7 (L) 14.0 - 18.0 g/dL    Hematocrit 27.9 (L) 40.0 - 54.0 %    MCV 95 82 - 98 fL    MCH 29.5 27.0 - 31.0 pg    MCHC 31.2 (L) 32.0 - 36.0 g/dL    RDW 19.1 (H) 11.5 - 14.5 %    Platelets 133 (L) 150 - 350 K/uL    MPV 11.4 9.2 - 12.9 fL    Gran # 4.6 1.8 - 7.7 K/uL    Lymph # 0.9 (L) 1.0 - 4.8 K/uL    Mono # 0.4 0.3 - 1.0 K/uL    Eos # 0.0 0.0 - 0.5 K/uL    Baso # 0.03 0.00 - 0.20 K/uL    Gran% 77.5 (H) 38.0 - 73.0 %    Lymph% 14.4 (L) 18.0 - 48.0 %    Mono% 6.9 4.0 - 15.0 %    Eosinophil% 0.7 0.0 - 8.0 %    Basophil% 0.5 0.0 - 1.9 %    Differential Method Automated    Basic metabolic panel   Result Value Ref Range    Sodium 134 (L) 136 - 145 mmol/L    Potassium 3.9 3.5 - 5.1 mmol/L    Chloride 99 95 - 110 mmol/L    CO2 26 23 - 29 mmol/L    Glucose 221 (H) 70 - 110 mg/dL    BUN, Bld 21 8 - 23 mg/dL    Creatinine 6.4 (H) 0.5 - 1.4 mg/dL    Calcium 8.7 8.7 - 10.5 mg/dL    Anion Gap 9 8 - 16 mmol/L    eGFR if African American 9 (A) >60 mL/min/1.73 m^2    eGFR if non African American 8 (A) >60 mL/min/1.73 m^2   Magnesium   Result Value Ref Range    Magnesium 1.7 1.6 - 2.6 mg/dL   Phosphorus   Result Value Ref Range    Phosphorus 3.2 2.7 - 4.5 mg/dL   Renal function panel   Result Value Ref Range    Glucose 224 (H) 70 - 110 mg/dL    Sodium 135 (L) 136 - 145 mmol/L    Potassium 3.9 3.5 - 5.1 mmol/L    Chloride 98 95 - 110 mmol/L    CO2 25 23 - 29 mmol/L    BUN, Bld 20 8 - 23 mg/dL    Calcium 8.7 8.7 - 10.5 mg/dL    Creatinine 6.4 (H) 0.5 - 1.4 mg/dL    Albumin 2.4 (L) 3.5 - 5.2 g/dL    Phosphorus 3.2 2.7 - 4.5 mg/dL    eGFR if   9 (A) >60 mL/min/1.73 m^2    eGFR if non African American 8 (A) >60 mL/min/1.73 m^2    Anion Gap 12 8 - 16 mmol/L   2D echo with color flow doppler   Result Value Ref Range    EF 25 (A) 55 - 65    Mitral Valve Regurgitation MODERATE (A)     Diastolic Dysfunction Yes (A)     Est. PA Systolic Pressure 80.25 (A)     Tricuspid Valve Regurgitation MODERATE TO SEVERE (A)    POCT glucose   Result Value Ref Range    POCT Glucose 235 (H) 70 - 110 mg/dL   POCT glucose   Result Value Ref Range    POCT Glucose 207 (H) 70 - 110 mg/dL   POCT glucose   Result Value Ref Range    POCT Glucose 289 (H) 70 - 110 mg/dL   POCT glucose   Result Value Ref Range    POCT Glucose 214 (H) 70 - 110 mg/dL   POCT glucose   Result Value Ref Range    POCT Glucose 221 (H) 70 - 110 mg/dL   POCT glucose   Result Value Ref Range    POCT Glucose 244 (H) 70 - 110 mg/dL   POCT glucose   Result Value Ref Range    POCT Glucose 224 (H) 70 - 110 mg/dL       Imaging Results:  Imaging Results          X-Ray Chest PA And Lateral (Final result)  Result time 12/30/17 10:35:12    Final result by Alfonzo Delgado MD (12/30/17 10:35:12)                 Impression:     No change      Electronically signed by: ALFONZO DELGADO MD  Date:     12/30/17  Time:    10:35              Narrative:    History: Shortness of breath    Median sternotomy. Upper normal heart size. No change in bibasilar pleural effusions from 12/28/17. Bilateral lower lung infiltrates/atelectasis.                             X-Ray Chest AP Portable (Final result)  Result time 12/28/17 22:26:58    Final result by Naz Perkins MD (12/28/17 22:26:58)                 Impression:     Small-to-moderate right and small left pleural effusions with bibasilar patchy opacities, probably atelectasis, although infiltrate not excluded.          Electronically signed by: NAZ PERKINS  Date:     12/28/17  Time:    22:26              Narrative:    Chest x-ray single view    Indication: Shortness  of breath.    Findings: No prior study.  Status post cardiothoracic surgery with sternal wires.  Normal size heart.  No vascular congestion.  Bilateral pleural effusions, right greater than left, blunting the costophrenic angles extend along the lower chest sidewalls.  Patchy opacities bibasilar distributions of likely superimposed atelectasis.  Infiltrate not excluded.  Upper lungs are clear.                             The EKG was ordered, reviewed, and independently interpreted by the ED provider.  Interpretation time: 2139  Rate: 97 BPM  Rhythm: normal sinus rhythm  Interpretation: Left axis deviation. RBBB. Left ventricular hypertrophy with repolarization abnoramlity . No STEMI.         The Emergency Provider reviewed the vital signs and test results, which are outlined above.    ED Discussion     11:01 PM: Re-evaluated pt. Pt is resting comfortably and is in no acute distress.  D/w pt all pertinent results. D/w pt any concerns expressed at this time. Answered all questions. Pt expresses understanding at this time.    11:44 PM: Discussed case with Lila Hdz NP ([facility]). Dr. Lai agrees with current care and management of pt and accepts admission.   Admitting Service: [facility] medicine   Admitting Physician: Dr. Lai  Admit to: Obs    12:02 AM: Re-evaluated pt. I have discussed test results, shared treatment plan, and the need for admission with patient and family at bedside. Pt and family express understanding at this time and agree with all information. All questions answered. Pt and family have no further questions or concerns at this time. Pt is ready for admit.        ED Medication(s):  Medications   albuterol-ipratropium 2.5mg-0.5mg/3mL nebulizer solution 3 mL (3 mLs Nebulization Given 12/28/17 2143)   acetaminophen tablet 1,000 mg (1,000 mg Oral Given 12/28/17 2229)   oseltamivir capsule 75 mg (75 mg Oral Given 12/29/17 0037)   furosemide injection 60 mg (60 mg Intravenous Given 12/29/17  0226)   epoetin lorraine injection 10,000 Units (10,000 Units Intravenous Given 12/29/17 1856)   heparin (porcine) injection 3,000 Units (3,000 Units Intravenous Given 12/29/17 1825)   albuterol-ipratropium 2.5mg-0.5mg/3mL nebulizer solution 3 mL (3 mLs Nebulization Given 12/29/17 2058)   sevelamer carbonate tablet 1,600 mg (1,600 mg Oral Given 12/29/17 2251)       Discharge Medication List as of 12/30/2017  1:39 PM          Follow-up Information     [facility] In 1 week.    Contact information:  [address] [facility] [address] [phone]             Ant Gerard MD In 1 week.    Specialty:  Nephrology  Contact information:  [address]  [facility] [address] [phone]                     Medical Decision Making    Medical Decision Making:   Clinical Tests:   Lab Tests: Ordered and Reviewed  Radiological Study: Ordered and Reviewed  Medical Tests: Ordered and Reviewed           Scribe Attestation:   Scribe #1: I performed the above scribed service and the documentation accurately describes the services I performed. I attest to the accuracy of the note.    Attending:   Physician Attestation Statement for Scribe #1: I, Johnathan Silver Jr., MD, personally performed the services described in this documentation, as scribed by Mojgan Baker, in my presence, and it is both accurate and complete.          Clinical Impression       ICD-10-CM ICD-9-CM   1. Influenza A J10.1 487.1   2. SOB (shortness of breath) R06.02 786.05   3. Hypoxemia R09.02 799.02   4. Pleural effusion, bilateral J90 511.9   5. ESRD (end stage renal disease) N18.6 585.6   6. Systolic CHF, acute on chronic I50.23 428.23     428.0   7. Essential hypertension I10 401.9   8. Acute on chronic systolic heart failure I50.23 428.23       Disposition:   Disposition: Placed in Observation  Condition: Sujey Silver Jr., MD  01/02/18 8604

## 2017-12-29 NOTE — ASSESSMENT & PLAN NOTE
- Receives HD M/W/F.  - Nephrology consult in AM for HD.  - Resume home Renal meds.  - Repeat BMP in AM.

## 2017-12-29 NOTE — PLAN OF CARE
Problem: Patient Care Overview  Goal: Plan of Care Review  Outcome: Ongoing (interventions implemented as appropriate)  POC discussed w/patient, verbalized understanding. NSR/ST on monitor. VSS. Voids per BRP. No c/o pain. IV atx administered. Frequent weight change encouraged. Patient turns independently in bed. Fall precautions in place, bed alarm on. Patient denies needs at this time.

## 2017-12-29 NOTE — ASSESSMENT & PLAN NOTE
IV Lasix 60mg x 1 dose given  Care discussed with Nephrology- UF with  HD.  - Strict I&O's, daily weights, Na/fluid restriction.  - Supplemental O2 and Duonebs as needed.  - Continue BB for systolic dysfunction.  - Will add lisinopril

## 2017-12-29 NOTE — SUBJECTIVE & OBJECTIVE
Past Medical History:   Diagnosis Date    CHF (congestive heart failure)     GERD (gastroesophageal reflux disease)     Hypertension     MI, old     Renal disorder        Past Surgical History:   Procedure Laterality Date    BYPASS GRAFT      CARDIAC SURGERY      CHOLECYSTECTOMY      DIALYSIS FISTULA CREATION         Review of patient's allergies indicates:  No Known Allergies  Current Facility-Administered Medications   Medication Frequency    acetaminophen tablet 650 mg Q6H PRN    albumin human 25% bottle 12.5 g PRN    aspirin chewable tablet 81 mg Daily    atorvastatin tablet 20 mg QHS    azithromycin 500 mg in dextrose 5 % 250 mL IVPB (ready to mix system) Q24H    calcitRIOL capsule 0.25 mcg Daily    cefTRIAXone (ROCEPHIN) 1 g in dextrose 5 % 50 mL IVPB Q24H    cinacalcet tablet 60 mg Daily with breakfast    dextromethorphan-guaifenesin  mg per 12 hr tablet 1 tablet BID    dextrose 50% injection 12.5 g PRN    dextrose 50% injection 25 g PRN    diphenhydrAMINE capsule 25 mg Q6H PRN    epoetin lorraine injection 10,000 Units Once    glucagon (human recombinant) injection 1 mg PRN    glucose chewable tablet 16 g PRN    glucose chewable tablet 24 g PRN    heparin (porcine) injection 3,000 Units Once    heparin (porcine) injection 5,000 Units Q8H    insulin aspart pen 0-5 Units QID (AC + HS) PRN    metoprolol succinate (TOPROL-XL) 24 hr tablet 25 mg Daily    ondansetron injection 4 mg Q6H PRN    [START ON 12/30/2017] oseltamivir capsule 75 mg Daily    pantoprazole EC tablet 40 mg Daily    promethazine-codeine 6.25-10 mg/5 ml syrup 5 mL Q8H PRN    ramelteon tablet 8 mg Nightly PRN    sevelamer carbonate tablet 800 mg TID WM     Family History     None        Social History Main Topics    Smoking status: Former Smoker    Smokeless tobacco: Not on file    Alcohol use No    Drug use: No    Sexual activity: Not on file     Review of Systems   Constitutional: Positive for activity  change and fatigue. Negative for appetite change.   HENT: Negative.    Respiratory: Positive for shortness of breath.    Cardiovascular: Negative.    Gastrointestinal: Negative.    Genitourinary: Negative.    Musculoskeletal: Negative.    Neurological: Negative.    Psychiatric/Behavioral: Negative.      Objective:     Vital Signs (Most Recent):  Temp: 98.8 °F (37.1 °C) (12/29/17 1521)  Pulse: 80 (12/29/17 1521)  Resp: 18 (12/29/17 1521)  BP: 124/72 (12/29/17 1521)  SpO2: 99 % (12/29/17 1521)  O2 Device (Oxygen Therapy): nasal cannula (12/29/17 1521) Vital Signs (24h Range):  Temp:  [97.9 °F (36.6 °C)-102.2 °F (39 °C)] 98.8 °F (37.1 °C)  Pulse:  [] 80  Resp:  [18-30] 18  SpO2:  [92 %-100 %] 99 %  BP: (110-163)/(56-75) 124/72     Weight: 99.8 kg (220 lb) (12/29/17 0433)  Body mass index is 34.46 kg/m².  Body surface area is 2.17 meters squared.    No intake/output data recorded.    Physical Exam   Constitutional: He is oriented to person, place, and time. He appears well-developed and well-nourished.   HENT:   Head: Normocephalic and atraumatic.   Neck: No JVD present.   Cardiovascular: Normal rate, regular rhythm and normal heart sounds.  Exam reveals no friction rub.    Pulmonary/Chest: Effort normal. He has rales.   Abdominal: Soft. Bowel sounds are normal.   Musculoskeletal: He exhibits edema.   Neurological: He is alert and oriented to person, place, and time.   Skin: Skin is warm and dry.   Psychiatric: He has a normal mood and affect. His behavior is normal.   Nursing note and vitals reviewed.      Significant Labs: reviewed  BMP  Lab Results   Component Value Date     12/29/2017    K 3.7 12/29/2017    CL 97 12/29/2017    CO2 29 12/29/2017    BUN 31 (H) 12/29/2017    CREATININE 8.4 (H) 12/29/2017    CALCIUM 8.9 12/29/2017    ANIONGAP 12 12/29/2017    ESTGFRAFRICA 7 (A) 12/29/2017    EGFRNONAA 6 (A) 12/29/2017     Lab Results   Component Value Date    WBC 5.88 12/29/2017    HGB 8.6 (L) 12/29/2017     HCT 27.6 (L) 12/29/2017    MCV 95 12/29/2017     12/29/2017     Significant Imaging: CXR reviewed

## 2017-12-29 NOTE — ASSESSMENT & PLAN NOTE
- BNP 3654; CXR shows small to moderate right and small left pleural effusions.  - Will give IV Lasix 60mg x 1 dose now.  - Nephrology consult in AM for HD.  - Strict I&O's, daily weights, Na/fluid restriction.  - Supplemental O2 and Duonebs as needed.  - Continue BB for systolic dysfunction.  - Will add ACEi/ARB as BP allows.

## 2017-12-29 NOTE — HPI
Pt was seen and examined. H/p was reviewed. Pt is a 73 y/o male with ESRD on chronic HD q MWF who was admitted with SOB since yesterday and subjective fever. Pt is positive for influenza A. But he also has evidence of fluid gain due to CHD exacerbation. Outside echo shows EF of 20%. HD orders discussed with HD nurse. Mgmt reviewed with PCP. Pt has been on HD x 4 years, and usually does not miss HD.

## 2017-12-29 NOTE — PROGRESS NOTES
Home Oxygen Evaluation    Date Performed: 2017    1) Patient's Home O2 Sat on room air, while at rest: 93%        If O2 sats on room air at rest are 88% or below, patient qualifies. No additional testing needed. Document N/A in steps 2 and 3. If 89% or above, complete steps 2.      2) Patient's O2 Sat on room air while exercisin%        If O2 sats on room air while exercising remain 89% or above patient does not qualify, no further testing needed Document N/A in step 3. If O2 sats on room air while exercising are 88% or below, continue to step 3.      3) Patient's O2 Sat while exercising on O2: 98% at 2 LPM         (Must show improvement from #2 for patients to qualify)    If O2 sats improve on oxygen, patient qualifies for portable oxygen. If not, the patient does not qualify.

## 2017-12-29 NOTE — SUBJECTIVE & OBJECTIVE
Past Medical History:   Diagnosis Date    CHF (congestive heart failure)     GERD (gastroesophageal reflux disease)     Hypertension     MI, old     Renal disorder        Past Surgical History:   Procedure Laterality Date    BYPASS GRAFT      CARDIAC SURGERY      CHOLECYSTECTOMY      DIALYSIS FISTULA CREATION         Review of patient's allergies indicates:  No Known Allergies    No current facility-administered medications on file prior to encounter.      No current outpatient prescriptions on file prior to encounter.     Family History     Reviewed and not pertinent.         Social History Main Topics    Smoking status: Former Smoker    Smokeless tobacco: Never used    Alcohol use No    Drug use: No       Review of Systems   Constitutional: Positive for chills, fatigue and fever. Negative for activity change, diaphoresis and unexpected weight change.   HENT: Negative for congestion, postnasal drip, rhinorrhea, sinus pain, sore throat and trouble swallowing.    Eyes: Negative for photophobia and visual disturbance.   Respiratory: Positive for cough and shortness of breath. Negative for apnea, chest tightness and wheezing.    Cardiovascular: Negative for chest pain, palpitations and leg swelling.   Gastrointestinal: Negative for abdominal distention, abdominal pain, blood in stool, constipation, diarrhea, nausea and vomiting.   Endocrine: Negative for polydipsia, polyphagia and polyuria.   Genitourinary: Negative for decreased urine volume, difficulty urinating, dysuria, frequency, hematuria and urgency.   Musculoskeletal: Negative for arthralgias, back pain, gait problem, joint swelling and myalgias.   Skin: Negative for pallor, rash and wound.   Neurological: Negative for dizziness, seizures, syncope, facial asymmetry, speech difficulty, weakness, light-headedness, numbness and headaches.   Psychiatric/Behavioral: Negative for confusion. The patient is not nervous/anxious.    All other systems  reviewed and are negative.    Objective:     Vital Signs (Most Recent):  Temp: 98.6 °F (37 °C) (12/29/17 0111)  Pulse: 85 (12/29/17 0111)  Resp: (!) 22 (12/29/17 0111)  BP: (!) 110/59 (12/29/17 0111)  SpO2: 98 % (12/29/17 0111) Vital Signs (24h Range):  Temp:  [98.6 °F (37 °C)-102.2 °F (39 °C)] 98.6 °F (37 °C)  Pulse:  [] 85  Resp:  [22-30] 22  SpO2:  [92 %-100 %] 98 %  BP: (110-163)/(56-75) 110/59        There is no height or weight on file to calculate BMI.    Physical Exam   Constitutional: He is oriented to person, place, and time. He appears well-developed and well-nourished.   HENT:   Head: Normocephalic and atraumatic.   Eyes: Conjunctivae are normal.   Neck: Normal range of motion. Neck supple. No JVD present.   Cardiovascular: Normal rate, regular rhythm, normal heart sounds and intact distal pulses.  Exam reveals no gallop.    No murmur heard.  Pulmonary/Chest: Effort normal. No respiratory distress. He has no wheezes. He has rales (Coarse to bilateral lower lobes).   Abdominal: Soft. Bowel sounds are normal. He exhibits no distension. There is no tenderness.   Musculoskeletal: Normal range of motion. He exhibits no edema, tenderness or deformity.   Neurological: He is alert and oriented to person, place, and time. No cranial nerve deficit or sensory deficit.   Skin: Skin is warm and dry. Capillary refill takes less than 2 seconds. No rash noted. He is not diaphoretic. No cyanosis or erythema. No pallor.   Psychiatric: He has a normal mood and affect. His speech is normal and behavior is normal. Cognition and memory are normal.   Nursing note and vitals reviewed.          Significant Labs:   Results for orders placed or performed during the hospital encounter of 12/28/17   CBC auto differential   Result Value Ref Range    WBC 7.44 3.90 - 12.70 K/uL    RBC 3.21 (L) 4.60 - 6.20 M/uL    Hemoglobin 9.6 (L) 14.0 - 18.0 g/dL    Hematocrit 30.1 (L) 40.0 - 54.0 %    MCV 94 82 - 98 fL    MCH 29.9 27.0 - 31.0  pg    MCHC 31.9 (L) 32.0 - 36.0 g/dL    RDW 19.4 (H) 11.5 - 14.5 %    Platelets 158 150 - 350 K/uL    MPV 10.8 9.2 - 12.9 fL    Gran # 5.4 1.8 - 7.7 K/uL    Lymph # 1.4 1.0 - 4.8 K/uL    Mono # 0.5 0.3 - 1.0 K/uL    Eos # 0.0 0.0 - 0.5 K/uL    Baso # 0.03 0.00 - 0.20 K/uL    Gran% 72.9 38.0 - 73.0 %    Lymph% 19.1 18.0 - 48.0 %    Mono% 7.1 4.0 - 15.0 %    Eosinophil% 0.5 0.0 - 8.0 %    Basophil% 0.4 0.0 - 1.9 %    Differential Method Automated    B-Type natriuretic peptide (BNP)   Result Value Ref Range    BNP 3,654 (H) 0 - 99 pg/mL   Influenza antigen Nasopharyngeal Swab   Result Value Ref Range    Influenza A Ag, EIA Positive (A) Negative    Influenza B Ag, EIA Negative Negative    Flu A & B Source Nasopharyngeal Swab    Comprehensive metabolic panel   Result Value Ref Range    Sodium 137 136 - 145 mmol/L    Potassium 3.8 3.5 - 5.1 mmol/L    Chloride 97 95 - 110 mmol/L    CO2 25 23 - 29 mmol/L    Glucose 181 (H) 70 - 110 mg/dL    BUN, Bld 29 (H) 8 - 23 mg/dL    Creatinine 7.9 (H) 0.5 - 1.4 mg/dL    Calcium 9.2 8.7 - 10.5 mg/dL    Total Protein 7.1 6.0 - 8.4 g/dL    Albumin 2.8 (L) 3.5 - 5.2 g/dL    Total Bilirubin 0.5 0.1 - 1.0 mg/dL    Alkaline Phosphatase 67 55 - 135 U/L    AST 55 (H) 10 - 40 U/L    ALT 36 10 - 44 U/L    Anion Gap 15 8 - 16 mmol/L    eGFR if African American 7 (A) >60 mL/min/1.73 m^2    eGFR if non African American 6 (A) >60 mL/min/1.73 m^2   Troponin I   Result Value Ref Range    Troponin I 0.899 (H) 0.000 - 0.026 ng/mL      All pertinent labs within the past 24 hours have been reviewed.    Significant Imaging:   Imaging Results          X-Ray Chest AP Portable (Final result)  Result time 12/28/17 22:26:58    Final result by Naz Perkins MD (12/28/17 22:26:58)                 Impression:     Small-to-moderate right and small left pleural effusions with bibasilar patchy opacities, probably atelectasis, although infiltrate not excluded.          Electronically signed by: NAZ PERKINS  Date:      12/28/17  Time:    22:26              Narrative:    Chest x-ray single view    Indication: Shortness of breath.    Findings: No prior study.  Status post cardiothoracic surgery with sternal wires.  Normal size heart.  No vascular congestion.  Bilateral pleural effusions, right greater than left, blunting the costophrenic angles extend along the lower chest sidewalls.  Patchy opacities bibasilar distributions of likely superimposed atelectasis.  Infiltrate not excluded.  Upper lungs are clear.                             I have reviewed all pertinent imaging results/findings within the past 24 hours.

## 2017-12-29 NOTE — HOSPITAL COURSE
The pt was admitted with Influenza A, bilateral pneumonia, and decompensated systolic CHF EF 20%. Pt ws placed on Tamiflu, Iv antibiotics. He was given IV lasix and will receive ultrafiltration with HD. Pt has been compliant with meds and HD treatments. Care discussed with nephrology. Pt feels much more SOB than baseline- unable to recline past 45 degrees. Fever resolved. WBC normal.   Patient qualify for home oxygen with activity . Home oxygen will be arranged. empirically treat pneumonia for three more days to complete 5 day course .treat flu for 3 more days . Complete 5 day course . Scheduled for dialysis tomorrow. Follow with cardiology,nephrology and pcp . Patient seen and examined today

## 2017-12-29 NOTE — HPI
James Kennedy is a 72 y.o. male patient with PMHx of CHF who presents to the Emergency Department for SOB which onset gradually 2 weeks ago. Patient reports [facility] for URI sxs. Symptoms are constant and moderate in severity. No mitigating or exacerbating factors reported. Associated sxs include chest tightness, fever, and chills. Patient reports fever and chills onset yesterday after dialysis. Patient reports having dialysis on Mondays, Wednesdays, and Fridays. Patient's nephrologist is Dr. Rae. Patient denies any CP, diaphoresis, palpitations, extremity weakness/numbness, leg swelling, abd pain, hematochezia, dizziness, cough, N/V, and all other sxs at this time. Patient reports hx of using cigarettes, but states he has not smoked in the last 3 weeks.  Initial work-up in ED resulted cr. 7.9, BUN 29, K 3.8, BNP 3654, troponin 0.899, influenza A positive.  CXR showed pulmonary vascular congestion, small to moderate right and small left pleural effusions.  Hospital Medicine was consulted for admission.

## 2017-12-29 NOTE — NURSING
Attempted to assess for diabetes educational needs following chart review   Patient currently off unit  Diabetes is not a new diagnosis  Please consult if any diabetes educational needs are identified

## 2017-12-29 NOTE — PLAN OF CARE
Problem: Patient Care Overview  Goal: Plan of Care Review  Outcome: Ongoing (interventions implemented as appropriate)  NSR on the monitor on 2L NC. Droplet precautions maintained. IV lasix given. IVELISSE shunt; POC reviewed with pt. BG monitored. Will continue to monitor.

## 2017-12-29 NOTE — PROGRESS NOTES
RT came to do ambulatory oxygen eval. Patient is on RA spo2 93% at rest. Patient does not want to walk right now. RT will come back.

## 2017-12-29 NOTE — ASSESSMENT & PLAN NOTE
ESRD: chronic HD q MWF.  K normal  Acid base stable  Pt has fluid overload, pulmonary edema  Providing HD today for UF  Has been stable on HD, has not missed  SOB due to pulm edema  SOB is also due to influenza A

## 2017-12-29 NOTE — ED NOTES
Pt resting in bed. rr equal. Remain course throughout. Head of bed 90 Degrees. Reports marked relief after meds given. Denies further complaints. 02WNL  On 2L nc. Pt NSR on monitor. Fever improving after tylenol given. BP WNL. Will continue to monitor.

## 2017-12-29 NOTE — ASSESSMENT & PLAN NOTE
Positive influenza A.  On tamiflu: adjust dose to 75 mg po qd (not bid) for renal dose adjustment.

## 2017-12-29 NOTE — ASSESSMENT & PLAN NOTE
Acute on chronic CHF.  Systolic CHF with EF 25%  Providing HD for UF today  Pt was advised on salt (2-3 g/day) and fluid restriction (1.5 L/day)

## 2017-12-29 NOTE — H&P
[facility] Hospital Medicine  History & Physical    Patient Name: James Kennedy  MRN: [id number]  Admission Date: 12/28/2017  Attending Physician: Ryan Lai MD  Primary Care Provider: [facility]         Patient information was obtained from patient, past medical records and ER records.     Subjective:     Principal Problem:Acute on chronic systolic heart failure    Chief Complaint:   Chief Complaint   Patient presents with    Shortness of Breath     shorntess of breath, chest pain, coughing and wheezing; dialysis M/W/F        HPI: James Kennedy is a 72 y.o. male patient with PMHx of CHF who presents to the Emergency Department for SOB which onset gradually 2 weeks ago. Patient reports being [facility] 12[facility]20/17 for URI sxs. Symptoms are constant and moderate in severity. No mitigating or exacerbating factors reported. Associated sxs include chest tightness, fever, and chills. Patient reports fever and chills onset yesterday after dialysis. Patient reports having dialysis on Mondays, Wednesdays, and Fridays. Patient's nephrologist is Dr. Rea. Patient denies any CP, diaphoresis, palpitations, extremity weakness/numbness, leg swelling, abd pain, hematochezia, dizziness, cough, N/V, and all other sxs at this time. Patient reports hx of using cigarettes, but states he has not smoked in the last 3 weeks.  Initial work-up in ED resulted cr. 7.9, BUN 29, K 3.8, BNP 3654, troponin 0.899, influenza A positive.  CXR showed pulmonary vascular congestion, small to moderate right and small left pleural effusions.  Hospital Medicine was consulted for admission.      Past Medical History:   Diagnosis Date    CHF (congestive heart failure)     GERD (gastroesophageal reflux disease)     Hypertension     MI, old     Renal disorder        Past Surgical History:   Procedure Laterality Date    BYPASS GRAFT      CARDIAC SURGERY      CHOLECYSTECTOMY       DIALYSIS FISTULA CREATION         Review of patient's allergies indicates:  No Known Allergies    No current facility-administered medications on file prior to encounter.      No current outpatient prescriptions on file prior to encounter.     Family History     Reviewed and not pertinent.         Social History Main Topics    Smoking status: Former Smoker    Smokeless tobacco: Never used    Alcohol use No    Drug use: No       Review of Systems   Constitutional: Positive for chills, fatigue and fever. Negative for activity change, diaphoresis and unexpected weight change.   HENT: Negative for congestion, postnasal drip, rhinorrhea, sinus pain, sore throat and trouble swallowing.    Eyes: Negative for photophobia and visual disturbance.   Respiratory: Positive for cough and shortness of breath. Negative for apnea, chest tightness and wheezing.    Cardiovascular: Negative for chest pain, palpitations and leg swelling.   Gastrointestinal: Negative for abdominal distention, abdominal pain, blood in stool, constipation, diarrhea, nausea and vomiting.   Endocrine: Negative for polydipsia, polyphagia and polyuria.   Genitourinary: Negative for decreased urine volume, difficulty urinating, dysuria, frequency, hematuria and urgency.   Musculoskeletal: Negative for arthralgias, back pain, gait problem, joint swelling and myalgias.   Skin: Negative for pallor, rash and wound.   Neurological: Negative for dizziness, seizures, syncope, facial asymmetry, speech difficulty, weakness, light-headedness, numbness and headaches.   Psychiatric/Behavioral: Negative for confusion. The patient is not nervous/anxious.    All other systems reviewed and are negative.    Objective:     Vital Signs (Most Recent):  Temp: 98.6 °F (37 °C) (12/29/17 0111)  Pulse: 85 (12/29/17 0111)  Resp: (!) 22 (12/29/17 0111)  BP: (!) 110/59 (12/29/17 0111)  SpO2: 98 % (12/29/17 0111) Vital Signs (24h Range):  Temp:  [98.6 °F (37 °C)-102.2 °F (39 °C)] 98.6  °F (37 °C)  Pulse:  [] 85  Resp:  [22-30] 22  SpO2:  [92 %-100 %] 98 %  BP: (110-163)/(56-75) 110/59        There is no height or weight on file to calculate BMI.    Physical Exam   Constitutional: He is oriented to person, place, and time. He appears well-developed and well-nourished.   HENT:   Head: Normocephalic and atraumatic.   Eyes: Conjunctivae are normal.   Neck: Normal range of motion. Neck supple. No JVD present.   Cardiovascular: Normal rate, regular rhythm, normal heart sounds and intact distal pulses.  Exam reveals no gallop.    No murmur heard.  Pulmonary/Chest: Effort normal. No respiratory distress. He has no wheezes. He has rales (Coarse to bilateral lower lobes).   Abdominal: Soft. Bowel sounds are normal. He exhibits no distension. There is no tenderness.   Musculoskeletal: Normal range of motion. He exhibits no edema, tenderness or deformity.   Neurological: He is alert and oriented to person, place, and time. No cranial nerve deficit or sensory deficit.   Skin: Skin is warm and dry. Capillary refill takes less than 2 seconds. No rash noted. He is not diaphoretic. No cyanosis or erythema. No pallor.   Psychiatric: He has a normal mood and affect. His speech is normal and behavior is normal. Cognition and memory are normal.   Nursing note and vitals reviewed.          Significant Labs:   Results for orders placed or performed during the hospital encounter of 12/28/17   CBC auto differential   Result Value Ref Range    WBC 7.44 3.90 - 12.70 K/uL    RBC 3.21 (L) 4.60 - 6.20 M/uL    Hemoglobin 9.6 (L) 14.0 - 18.0 g/dL    Hematocrit 30.1 (L) 40.0 - 54.0 %    MCV 94 82 - 98 fL    MCH 29.9 27.0 - 31.0 pg    MCHC 31.9 (L) 32.0 - 36.0 g/dL    RDW 19.4 (H) 11.5 - 14.5 %    Platelets 158 150 - 350 K/uL    MPV 10.8 9.2 - 12.9 fL    Gran # 5.4 1.8 - 7.7 K/uL    Lymph # 1.4 1.0 - 4.8 K/uL    Mono # 0.5 0.3 - 1.0 K/uL    Eos # 0.0 0.0 - 0.5 K/uL    Baso # 0.03 0.00 - 0.20 K/uL    Gran% 72.9 38.0 - 73.0 %     Lymph% 19.1 18.0 - 48.0 %    Mono% 7.1 4.0 - 15.0 %    Eosinophil% 0.5 0.0 - 8.0 %    Basophil% 0.4 0.0 - 1.9 %    Differential Method Automated    B-Type natriuretic peptide (BNP)   Result Value Ref Range    BNP 3,654 (H) 0 - 99 pg/mL   Influenza antigen Nasopharyngeal Swab   Result Value Ref Range    Influenza A Ag, EIA Positive (A) Negative    Influenza B Ag, EIA Negative Negative    Flu A & B Source Nasopharyngeal Swab    Comprehensive metabolic panel   Result Value Ref Range    Sodium 137 136 - 145 mmol/L    Potassium 3.8 3.5 - 5.1 mmol/L    Chloride 97 95 - 110 mmol/L    CO2 25 23 - 29 mmol/L    Glucose 181 (H) 70 - 110 mg/dL    BUN, Bld 29 (H) 8 - 23 mg/dL    Creatinine 7.9 (H) 0.5 - 1.4 mg/dL    Calcium 9.2 8.7 - 10.5 mg/dL    Total Protein 7.1 6.0 - 8.4 g/dL    Albumin 2.8 (L) 3.5 - 5.2 g/dL    Total Bilirubin 0.5 0.1 - 1.0 mg/dL    Alkaline Phosphatase 67 55 - 135 U/L    AST 55 (H) 10 - 40 U/L    ALT 36 10 - 44 U/L    Anion Gap 15 8 - 16 mmol/L    eGFR if African American 7 (A) >60 mL/min/1.73 m^2    eGFR if non African American 6 (A) >60 mL/min/1.73 m^2   Troponin I   Result Value Ref Range    Troponin I 0.899 (H) 0.000 - 0.026 ng/mL      All pertinent labs within the past 24 hours have been reviewed.    Significant Imaging:   Imaging Results          X-Ray Chest AP Portable (Final result)  Result time 12/28/17 22:26:58    Final result by Naz Perkins MD (12/28/17 22:26:58)                 Impression:     Small-to-moderate right and small left pleural effusions with bibasilar patchy opacities, probably atelectasis, although infiltrate not excluded.          Electronically signed by: NAZ PERKINS  Date:     12/28/17  Time:    22:26              Narrative:    Chest x-ray single view    Indication: Shortness of breath.    Findings: No prior study.  Status post cardiothoracic surgery with sternal wires.  Normal size heart.  No vascular congestion.  Bilateral pleural effusions, right greater than left,  blunting the costophrenic angles extend along the lower chest sidewalls.  Patchy opacities bibasilar distributions of likely superimposed atelectasis.  Infiltrate not excluded.  Upper lungs are clear.                             I have reviewed all pertinent imaging results/findings within the past 24 hours.        Assessment/Plan:     * Acute on chronic systolic HFrEF of 20%    - BNP 3654; CXR shows small to moderate right and small left pleural effusions.  - Will give IV Lasix 60mg x 1 dose now.  - Nephrology consult in AM for HD.  - Strict I&O's, daily weights, Na/fluid restriction.  - Supplemental O2 and Duonebs as needed.  - Continue BB for systolic dysfunction.  - Will add ACEi/ARB as BP allows.        Essential hypertension    - BP stable.  - Continue home beta blocker.  - Monitor BP trends.        ESRD (end stage renal disease) on HD    - Receives HD M/W/F.  - Nephrology consult in AM for HD.  - Resume home Renal meds.  - Repeat BMP in AM.        Influenza A    - Tamiflu.  - Supportive care.          VTE Risk Mitigation         Ordered     heparin (porcine) injection 5,000 Units  Every 8 hours     Route:  Subcutaneous        12/29/17 0149     Medium Risk of VTE  Once      12/29/17 0106     Place sequential compression device  Until discontinued      12/29/17 0106             RADHA Munguia  Department of Hospital Medicine   [facility]

## 2017-12-29 NOTE — SUBJECTIVE & OBJECTIVE
Review of Systems   Constitutional: Positive for fatigue. Negative for appetite change, chills, diaphoresis and fever.   HENT: Negative for congestion, nosebleeds, sore throat and trouble swallowing.    Eyes: Negative for pain, discharge and visual disturbance.   Respiratory: Positive for cough, chest tightness, shortness of breath and wheezing. Negative for apnea and stridor.         Orthopnea    Cardiovascular: Negative for chest pain, palpitations and leg swelling.   Gastrointestinal: Negative for abdominal distention, abdominal pain, blood in stool, constipation, diarrhea, nausea and vomiting.   Endocrine: Negative for cold intolerance and heat intolerance.   Genitourinary: Negative for difficulty urinating, dysuria, flank pain, frequency and urgency.   Musculoskeletal: Negative for arthralgias, back pain, joint swelling, myalgias, neck pain and neck stiffness.   Skin: Negative for rash and wound.   Allergic/Immunologic: Negative for food allergies and immunocompromised state.   Neurological: Negative for dizziness, seizures, syncope, facial asymmetry, weakness, light-headedness and headaches.   Hematological: Negative for adenopathy.   Psychiatric/Behavioral: Negative for agitation, behavioral problems and confusion. The patient is not nervous/anxious.      Objective:     Vital Signs (Most Recent):  Temp: 98.8 °F (37.1 °C) (12/29/17 1521)  Pulse: 80 (12/29/17 1521)  Resp: 18 (12/29/17 1521)  BP: 124/72 (12/29/17 1521)  SpO2: 99 % (12/29/17 1521) Vital Signs (24h Range):  Temp:  [97.9 °F (36.6 °C)-102.2 °F (39 °C)] 98.8 °F (37.1 °C)  Pulse:  [] 80  Resp:  [18-30] 18  SpO2:  [92 %-100 %] 99 %  BP: (110-163)/(56-75) 124/72     Weight: 99.8 kg (220 lb)  Body mass index is 34.46 kg/m².  No intake or output data in the 24 hours ending 12/29/17 1740   Physical Exam   Constitutional: He is oriented to person, place, and time. No distress.   Frail, elderly    HENT:   Head: Normocephalic and atraumatic.   Nose:  Nose normal.   Mouth/Throat: Oropharynx is clear and moist.   Eyes: Conjunctivae are normal. No scleral icterus.   Neck: Normal range of motion. Neck supple. JVD present.   Cardiovascular: Normal rate, regular rhythm, normal heart sounds and intact distal pulses.  Exam reveals no gallop and no friction rub.    No murmur heard.  Pulmonary/Chest: Effort normal. No stridor. No respiratory distress. He has wheezes. He has rales. He exhibits no tenderness.   Crackles bilaterally   Conversational dyspnea    Abdominal: Soft. Bowel sounds are normal. He exhibits no distension. There is no tenderness. There is no rebound and no guarding.   Musculoskeletal: Normal range of motion. He exhibits no edema, tenderness or deformity.   Neurological: He is alert and oriented to person, place, and time. He has normal reflexes. No cranial nerve deficit. He exhibits normal muscle tone. Coordination normal.   Skin: Skin is warm and dry. No rash noted. He is not diaphoretic. No erythema. No pallor.   Psychiatric: He has a normal mood and affect. His behavior is normal. Thought content normal.   Nursing note and vitals reviewed.      Significant Labs:   CBC:   Recent Labs  Lab 12/28/17 2159 12/29/17  0424   WBC 7.44 5.88   HGB 9.6* 8.6*   HCT 30.1* 27.6*    153     CMP:   Recent Labs  Lab 12/28/17  2242 12/29/17  0424    138   K 3.8 3.7   CL 97 97   CO2 25 29   * 208*   BUN 29* 31*   CREATININE 7.9* 8.4*   CALCIUM 9.2 8.9   PROT 7.1  --    ALBUMIN 2.8*  --    BILITOT 0.5  --    ALKPHOS 67  --    AST 55*  --    ALT 36  --    ANIONGAP 15 12   EGFRNONAA 6* 6*       Significant Imaging:   Imaging Results          X-Ray Chest AP Portable (Final result)  Result time 12/28/17 22:26:58    Final result by Harry Perkins MD (12/28/17 22:26:58)                 Impression:     Small-to-moderate right and small left pleural effusions with bibasilar patchy opacities, probably atelectasis, although infiltrate not  excluded.          Electronically signed by: NAZ VILLA  Date:     12/28/17  Time:    22:26              Narrative:    Chest x-ray single view    Indication: Shortness of breath.    Findings: No prior study.  Status post cardiothoracic surgery with sternal wires.  Normal size heart.  No vascular congestion.  Bilateral pleural effusions, right greater than left, blunting the costophrenic angles extend along the lower chest sidewalls.  Patchy opacities bibasilar distributions of likely superimposed atelectasis.  Infiltrate not excluded.  Upper lungs are clear.

## 2017-12-30 VITALS
HEIGHT: 67 IN | OXYGEN SATURATION: 98 % | WEIGHT: 224 LBS | RESPIRATION RATE: 18 BRPM | SYSTOLIC BLOOD PRESSURE: 136 MMHG | HEART RATE: 70 BPM | TEMPERATURE: 99 F | DIASTOLIC BLOOD PRESSURE: 64 MMHG | BODY MASS INDEX: 35.16 KG/M2

## 2017-12-30 LAB
ALBUMIN SERPL BCP-MCNC: 2.4 G/DL
ANION GAP SERPL CALC-SCNC: 12 MMOL/L
ANION GAP SERPL CALC-SCNC: 9 MMOL/L
BASOPHILS # BLD AUTO: 0.03 K/UL
BASOPHILS NFR BLD: 0.5 %
BUN SERPL-MCNC: 20 MG/DL
BUN SERPL-MCNC: 21 MG/DL
CALCIUM SERPL-MCNC: 8.7 MG/DL
CALCIUM SERPL-MCNC: 8.7 MG/DL
CHLORIDE SERPL-SCNC: 98 MMOL/L
CHLORIDE SERPL-SCNC: 99 MMOL/L
CO2 SERPL-SCNC: 25 MMOL/L
CO2 SERPL-SCNC: 26 MMOL/L
CREAT SERPL-MCNC: 6.4 MG/DL
CREAT SERPL-MCNC: 6.4 MG/DL
DIASTOLIC DYSFUNCTION: YES
DIFFERENTIAL METHOD: ABNORMAL
EOSINOPHIL # BLD AUTO: 0 K/UL
EOSINOPHIL NFR BLD: 0.7 %
ERYTHROCYTE [DISTWIDTH] IN BLOOD BY AUTOMATED COUNT: 19.1 %
EST. GFR  (AFRICAN AMERICAN): 9 ML/MIN/1.73 M^2
EST. GFR  (AFRICAN AMERICAN): 9 ML/MIN/1.73 M^2
EST. GFR  (NON AFRICAN AMERICAN): 8 ML/MIN/1.73 M^2
EST. GFR  (NON AFRICAN AMERICAN): 8 ML/MIN/1.73 M^2
ESTIMATED PA SYSTOLIC PRESSURE: 80.25
GLUCOSE SERPL-MCNC: 221 MG/DL
GLUCOSE SERPL-MCNC: 224 MG/DL
HCT VFR BLD AUTO: 27.9 %
HGB BLD-MCNC: 8.7 G/DL
LYMPHOCYTES # BLD AUTO: 0.9 K/UL
LYMPHOCYTES NFR BLD: 14.4 %
MAGNESIUM SERPL-MCNC: 1.7 MG/DL
MCH RBC QN AUTO: 29.5 PG
MCHC RBC AUTO-ENTMCNC: 31.2 G/DL
MCV RBC AUTO: 95 FL
MITRAL VALVE REGURGITATION: ABNORMAL
MONOCYTES # BLD AUTO: 0.4 K/UL
MONOCYTES NFR BLD: 6.9 %
NEUTROPHILS # BLD AUTO: 4.6 K/UL
NEUTROPHILS NFR BLD: 77.5 %
PHOSPHATE SERPL-MCNC: 3.2 MG/DL
PHOSPHATE SERPL-MCNC: 3.2 MG/DL
PLATELET # BLD AUTO: 133 K/UL
PMV BLD AUTO: 11.4 FL
POCT GLUCOSE: 221 MG/DL (ref 70–110)
POCT GLUCOSE: 224 MG/DL (ref 70–110)
POCT GLUCOSE: 244 MG/DL (ref 70–110)
POTASSIUM SERPL-SCNC: 3.9 MMOL/L
POTASSIUM SERPL-SCNC: 3.9 MMOL/L
RBC # BLD AUTO: 2.95 M/UL
RETIRED EF AND QEF - SEE NOTES: 25 (ref 55–65)
SODIUM SERPL-SCNC: 134 MMOL/L
SODIUM SERPL-SCNC: 135 MMOL/L
TRICUSPID VALVE REGURGITATION: ABNORMAL
WBC # BLD AUTO: 5.91 K/UL

## 2017-12-30 PROCEDURE — 80048 BASIC METABOLIC PNL TOTAL CA: CPT

## 2017-12-30 PROCEDURE — 36415 COLL VENOUS BLD VENIPUNCTURE: CPT

## 2017-12-30 PROCEDURE — 99233 SBSQ HOSP IP/OBS HIGH 50: CPT | Mod: ,,, | Performed by: INTERNAL MEDICINE

## 2017-12-30 PROCEDURE — 80069 RENAL FUNCTION PANEL: CPT

## 2017-12-30 PROCEDURE — 25000003 PHARM REV CODE 250: Performed by: INTERNAL MEDICINE

## 2017-12-30 PROCEDURE — 25000242 PHARM REV CODE 250 ALT 637 W/ HCPCS: Performed by: NURSE PRACTITIONER

## 2017-12-30 PROCEDURE — 87070 CULTURE OTHR SPECIMN AEROBIC: CPT

## 2017-12-30 PROCEDURE — 96375 TX/PRO/DX INJ NEW DRUG ADDON: CPT

## 2017-12-30 PROCEDURE — 94761 N-INVAS EAR/PLS OXIMETRY MLT: CPT

## 2017-12-30 PROCEDURE — 84100 ASSAY OF PHOSPHORUS: CPT

## 2017-12-30 PROCEDURE — 83735 ASSAY OF MAGNESIUM: CPT

## 2017-12-30 PROCEDURE — 27000221 HC OXYGEN, UP TO 24 HOURS

## 2017-12-30 PROCEDURE — 85025 COMPLETE CBC W/AUTO DIFF WBC: CPT

## 2017-12-30 PROCEDURE — 96374 THER/PROPH/DIAG INJ IV PUSH: CPT

## 2017-12-30 PROCEDURE — 87205 SMEAR GRAM STAIN: CPT

## 2017-12-30 PROCEDURE — 25000003 PHARM REV CODE 250: Performed by: NURSE PRACTITIONER

## 2017-12-30 RX ORDER — OSELTAMIVIR PHOSPHATE 75 MG/1
75 CAPSULE ORAL DAILY
Qty: 3 CAPSULE | Refills: 0 | Status: SHIPPED | OUTPATIENT
Start: 2017-12-31 | End: 2017-12-30

## 2017-12-30 RX ORDER — MOXIFLOXACIN HYDROCHLORIDE 400 MG/1
400 TABLET ORAL DAILY
Status: DISCONTINUED | OUTPATIENT
Start: 2017-12-30 | End: 2017-12-30 | Stop reason: HOSPADM

## 2017-12-30 RX ORDER — LISINOPRIL 5 MG/1
5 TABLET ORAL DAILY
Qty: 90 TABLET | Refills: 3 | Status: SHIPPED | OUTPATIENT
Start: 2017-12-31 | End: 2017-12-30

## 2017-12-30 RX ORDER — LISINOPRIL 5 MG/1
5 TABLET ORAL DAILY
Qty: 90 TABLET | Refills: 3 | Status: SHIPPED | OUTPATIENT
Start: 2017-12-31 | End: 2018-12-31

## 2017-12-30 RX ORDER — MOXIFLOXACIN HYDROCHLORIDE 400 MG/1
400 TABLET ORAL DAILY
Qty: 3 TABLET | Refills: 0 | Status: SHIPPED | OUTPATIENT
Start: 2017-12-31

## 2017-12-30 RX ORDER — OSELTAMIVIR PHOSPHATE 75 MG/1
75 CAPSULE ORAL DAILY
Qty: 3 CAPSULE | Refills: 0 | Status: SHIPPED | OUTPATIENT
Start: 2017-12-31 | End: 2018-01-03

## 2017-12-30 RX ORDER — MOXIFLOXACIN HYDROCHLORIDE 400 MG/1
400 TABLET ORAL DAILY
Qty: 3 TABLET | Refills: 0 | Status: SHIPPED | OUTPATIENT
Start: 2017-12-31 | End: 2017-12-30

## 2017-12-30 RX ADMIN — INSULIN ASPART 2 UNITS: 100 INJECTION, SOLUTION INTRAVENOUS; SUBCUTANEOUS at 10:12

## 2017-12-30 RX ADMIN — ACETAMINOPHEN 650 MG: 325 TABLET ORAL at 12:12

## 2017-12-30 RX ADMIN — GUAIFENESIN AND DEXTROMETHORPHAN HYDROBROMIDE 1 TABLET: 600; 30 TABLET, EXTENDED RELEASE ORAL at 08:12

## 2017-12-30 RX ADMIN — SEVELAMER CARBONATE 800 MG: 800 TABLET, FILM COATED ORAL at 11:12

## 2017-12-30 RX ADMIN — PANTOPRAZOLE SODIUM 40 MG: 40 TABLET, DELAYED RELEASE ORAL at 08:12

## 2017-12-30 RX ADMIN — LISINOPRIL 5 MG: 5 TABLET ORAL at 08:12

## 2017-12-30 RX ADMIN — MOXIFLOXACIN HYDROCHLORIDE 400 MG: 400 TABLET, FILM COATED ORAL at 11:12

## 2017-12-30 RX ADMIN — INSULIN ASPART 2 UNITS: 100 INJECTION, SOLUTION INTRAVENOUS; SUBCUTANEOUS at 04:12

## 2017-12-30 RX ADMIN — SEVELAMER CARBONATE 800 MG: 800 TABLET, FILM COATED ORAL at 08:12

## 2017-12-30 RX ADMIN — METOPROLOL SUCCINATE 25 MG: 25 TABLET, EXTENDED RELEASE ORAL at 08:12

## 2017-12-30 RX ADMIN — OSELTAMIVIR PHOSPHATE 75 MG: 75 CAPSULE ORAL at 08:12

## 2017-12-30 RX ADMIN — CALCITRIOL 0.25 MCG: 0.25 CAPSULE, LIQUID FILLED ORAL at 08:12

## 2017-12-30 RX ADMIN — SEVELAMER CARBONATE 800 MG: 800 TABLET, FILM COATED ORAL at 04:12

## 2017-12-30 RX ADMIN — CINACALCET HYDROCHLORIDE 60 MG: 30 TABLET, COATED ORAL at 08:12

## 2017-12-30 RX ADMIN — ASPIRIN 81 MG CHEWABLE TABLET 81 MG: 81 TABLET CHEWABLE at 08:12

## 2017-12-30 RX ADMIN — INSULIN ASPART 2 UNITS: 100 INJECTION, SOLUTION INTRAVENOUS; SUBCUTANEOUS at 05:12

## 2017-12-30 NOTE — PROGRESS NOTES
[facility] -   Nephrology  Progress Note    Patient Name: James Kennedy  MRN: [id number]  Admission Date: 12/28/2017  Hospital Length of Stay: 1 days  Attending Provider: Kenya Montalvo MD   Primary Care Physician: [facility]  Principal Problem:Acute on chronic systolic heart failure. ESRD on chronic HD    Subjective:     HPI: Pt was seen and examined. H/p was reviewed. Pt is a 71 y/o male with ESRD on chronic HD q MWF who was admitted with SOB since yesterday and subjective fever. Pt is positive for influenza A. But he also has evidence of fluid gain due to CHD exacerbation. Outside echo shows EF of 20%. HD orders discussed with HD nurse. Mgmt reviewed with PCP. Pt has been on HD x 4 years, and usually does not miss HD.    Interval History: Pt was seen and examined. Remained stable last pm, feels better this am. Pt had HD yesterday, signed off early by 1 hour (had 3 hours of HD, had 3 L removed). Given admission for fluid gain and pulm edema, repeat HD was offered to the pt. Pt refused. Discussed with PCP. Pt possibly be discharged today. Has no new c/o's, no CP, no SOB, no fever, no cough.    Review of patient's allergies indicates:  No Known Allergies  Current Facility-Administered Medications   Medication Frequency    acetaminophen tablet 650 mg Q6H PRN    albumin human 25% bottle 12.5 g PRN    aspirin chewable tablet 81 mg Daily    atorvastatin tablet 20 mg QHS    calcitRIOL capsule 0.25 mcg Daily    cinacalcet tablet 60 mg Daily with breakfast    dextromethorphan-guaifenesin  mg per 12 hr tablet 1 tablet BID    dextrose 50% injection 12.5 g PRN    dextrose 50% injection 25 g PRN    diphenhydrAMINE capsule 50 mg Nightly PRN    glucagon (human recombinant) injection 1 mg PRN    glucose chewable tablet 16 g PRN    glucose chewable tablet 24 g PRN    heparin (porcine) injection 5,000 Units Q8H    insulin aspart pen 0-5 Units QID (AC + HS) PRN     lisinopril tablet 5 mg Daily    metoprolol succinate (TOPROL-XL) 24 hr tablet 25 mg Daily    moxifloxacin tablet 400 mg Daily    ondansetron injection 4 mg Q6H PRN    oseltamivir capsule 75 mg Daily    pantoprazole EC tablet 40 mg Daily    promethazine-codeine 6.25-10 mg/5 ml syrup 5 mL Q8H PRN    ramelteon tablet 8 mg Nightly PRN    sevelamer carbonate tablet 800 mg TID WM       Objective:     Vital Signs (Most Recent):  Temp: 98.2 °F (36.8 °C) (12/30/17 0736)  Pulse: 72 (12/30/17 1105)  Resp: 18 (12/30/17 0736)  BP: 125/62 (12/30/17 0736)  SpO2: 99 % (12/30/17 0912)  O2 Device (Oxygen Therapy): nasal cannula (12/30/17 0912) Vital Signs (24h Range):  Temp:  [98.2 °F (36.8 °C)-100.2 °F (37.9 °C)] 98.2 °F (36.8 °C)  Pulse:  [65-96] 72  Resp:  [18-20] 18  SpO2:  [95 %-99 %] 99 %  BP: ()/(56-72) 125/62     Weight: 101.6 kg (224 lb) (12/30/17 0339)  Body mass index is 35.08 kg/m².  Body surface area is 2.19 meters squared.    I/O last 3 completed shifts:  In: 660 [P.O.:360; IV Piggyback:300]  Out: 3520 [Urine:20; Other:3500]    Physical Exam   Constitutional: He is oriented to person, place, and time. He appears well-developed and well-nourished.   HENT:   Head: Normocephalic and atraumatic.   Neck: No JVD present.   Cardiovascular: Normal rate, regular rhythm and normal heart sounds.  Exam reveals no friction rub.    Pulmonary/Chest: Effort normal. He has rales.   Abdominal: Soft. Bowel sounds are normal.   Musculoskeletal: He exhibits edema.   Neurological: He is alert and oriented to person, place, and time.   Skin: Skin is warm and dry.   Psychiatric: He has a normal mood and affect. His behavior is normal.   Nursing note and vitals reviewed.      Significant Labs: reviewed  BMP  Lab Results   Component Value Date     (L) 12/30/2017     (L) 12/30/2017    K 3.9 12/30/2017    K 3.9 12/30/2017    CL 99 12/30/2017    CL 98 12/30/2017    CO2 26 12/30/2017    CO2 25 12/30/2017    BUN 21  12/30/2017    BUN 20 12/30/2017    CREATININE 6.4 (H) 12/30/2017    CREATININE 6.4 (H) 12/30/2017    CALCIUM 8.7 12/30/2017    CALCIUM 8.7 12/30/2017    ANIONGAP 9 12/30/2017    ANIONGAP 12 12/30/2017    ESTGFRAFRICA 9 (A) 12/30/2017    ESTGFRAFRICA 9 (A) 12/30/2017    EGFRNONAA 8 (A) 12/30/2017    EGFRNONAA 8 (A) 12/30/2017     Lab Results   Component Value Date    WBC 5.91 12/30/2017    HGB 8.7 (L) 12/30/2017    HCT 27.9 (L) 12/30/2017    MCV 95 12/30/2017     (L) 12/30/2017       Significant Imaging: CXR reviewed    Assessment/Plan:   73 y/o male with ESRD on chronic HD presented for SOB:        ESRD (end stage renal disease) on HD     ESRD: chronic HD q MWF.  Tolerated HD well last pm with 3 L of UF, but signed off early.  Refused repeat HD today  K normal  Acid base stable  Fluid overload and pulmonary edema have improved  Has been stable on HD, has not missed, compliant pt  Can be dialyzed as out pt in f/u tomorrow (q MWF, but oupt HD schedule changes during the Holidays, dialyzing on Sunday instead)  SOB due to CHF/fluid gain, influenza A, and possibly pneumonia          * Acute on chronic systolic HFrEF of 20%     Acute on chronic CHF.  Systolic CHF with EF 25%  Good response to HD  Pt was advised on salt (2-3 g/day) and fluid restriction (1.5 L/day)                     Essential hypertension     HTN: BP controlled.  Meds reviewed       Influenza A/ID     Positive influenza A and possibly bacterial pneumonia  On tamiflu: adjust dose to 75 mg po qd adjusted renal dose adjustment.  Agree with adding avelox 400 mg po qd       Plans and recommendations:  As discussed above  Discussed with PCP          Ant Gerard MD  Nephrology  [facility]

## 2017-12-30 NOTE — PLAN OF CARE
Patient received hd for 3 hours today refused 4 hour tx. Net removal 3 liters. No access problems. Adm. Epogen with hd as ordered. Tolerated well.

## 2017-12-30 NOTE — PROGRESS NOTES
Call received from [facility]  advising that he had several deliveries ahead of patient but should have O2 delivered before 5:00 p.m..  Xochilt Jacobsen LMSW, DANNY-Marija, [facility]  12/30/2017

## 2017-12-30 NOTE — CONSULTS
Consult received for home O2.  Patient's order for O2 called into [employer] and faxed to [phone].  Awaiting delivery of portable O2 to patient's room.  Xochilt Jacobsen LMSW, DANNY-NAKUL, [facility]  12/30/2017

## 2017-12-30 NOTE — DISCHARGE SUMMARY
[facility] Hospital Medicine  Discharge Summary      Patient Name: James Kennedy  MRN: [id number]  Admission Date: 12/28/2017  Hospital Length of Stay: 1 days  Discharge Date and Time: No discharge date for patient encounter.  Attending Physician: Kenya Montalvo MD   Discharging Provider: Kenya Montalvo MD  Primary Care Provider: [facility]      HPI:   James Kennedy is a 72 y.o. male patient with PMHx of CHF who presents to the Emergency Department for SOB which onset gradually 2 weeks ago. Patient reports [facility]ra[facility] for URI sxs. Symptoms are constant and moderate in severity. No mitigating or exacerbating factors reported. Associated sxs include chest tightness, fever, and chills. Patient reports fever and chills onset yesterday after dialysis. Patient reports having dialysis on Mondays, Wednesdays, and Fridays. Patient's nephrologist is Dr. Rea. Patient denies any CP, diaphoresis, palpitations, extremity weakness/numbness, leg swelling, abd pain, hematochezia, dizziness, cough, N/V, and all other sxs at this time. Patient reports hx of using cigarettes, but states he has not smoked in the last 3 weeks.  Initial work-up in ED resulted cr. 7.9, BUN 29, K 3.8, BNP 3654, troponin 0.899, influenza A positive.  CXR showed pulmonary vascular congestion, small to moderate right and small left pleural effusions.  Hospital Medicine was consulted for admission.    * No surgery found *      Hospital Course:   The pt was admitted with Influenza A, bilateral pneumonia, and decompensated systolic CHF EF 20%. Pt ws placed on Tamiflu, Iv antibiotics. He was given IV lasix and will receive ultrafiltration with HD. Pt has been compliant with meds and HD treatments. Care discussed with nephrology. Pt feels much more SOB than baseline- unable to recline past 45 degrees. Fever resolved. WBC normal.   Patient qualify for home oxygen with  activity . Home oxygen will be arranged. empirically treat pneumonia for three more days to complete 5 day course .treat flu for 3 more days . Complete 5 day course . Scheduled for dialysis tomorrow. Follow with cardiology,nephrology and pcp . Patient seen and examined today      Consults:   Consults         Status Ordering Provider     Inpatient consult to Nephrology  Once     Provider:  Ant Gerard MD    Acknowledged PETR AGUIRRE JR     Inpatient consult to Social Work  Once     Provider:  (Not yet assigned)    Acknowledged ALEXIS BOSS          No new Assessment & Plan notes have been filed under this hospital service since the last note was generated.  Service: Hospital Medicine    Final Active Diagnoses:    Diagnosis Date Noted POA    PRINCIPAL PROBLEM:  Acute on chronic systolic HFrEF of 20% [I50.23] 12/28/2017 Yes    ESRD (end stage renal disease) on HD [N18.6] 12/29/2017 Yes     Chronic    Essential hypertension [I10] 12/29/2017 Yes     Chronic    Acute respiratory failure with hypoxia [J96.01] 12/29/2017 Yes    Pneumonia [J18.9] 12/29/2017 Yes    Influenza A [J10.1] 12/28/2017 Yes      Problems Resolved During this Admission:    Diagnosis Date Noted Date Resolved POA    SOB (shortness of breath) [R06.02]  12/30/2017 Unknown       Discharged Condition: stable    Disposition: Home or Self Care    Follow Up:  Follow-up Information     [facility] In 1 week.    Contact information:  [address] [facility] [address] [phone]             Ant Gerard MD In 1 week.    Specialty:  Nephrology  Contact information:  [address]  [place] [address] [phone]                 Patient Instructions:     OXYGEN FOR HOME USE   Order Specific Question Answer Comments   Liter Flow 2    Duration With activity    Qualifying SpO2: 88    Testing done at: Exercise/Activity    Route nasal cannula    Portable mode: continuous    Device home concentrator with  "portable unit    Patient condition with qualifying saturation CHF    Height: 5' 7" (1.702 m)    Weight: 101.6 kg (224 lb)    Alternative treatment measures have been tried or considered and deemed clinically ineffective. Yes          Significant Diagnostic Studies: Labs:   BMP:   Recent Labs  Lab 12/28/17 2242 12/29/17 0424 12/30/17  0440   * 208* 221*  224*    138 134*  135*   K 3.8 3.7 3.9  3.9   CL 97 97 99  98   CO2 25 29 26  25   BUN 29* 31* 21  20   CREATININE 7.9* 8.4* 6.4*  6.4*   CALCIUM 9.2 8.9 8.7  8.7   MG  --  2.0 1.7     Microbiology:   Blood Culture   Lab Results   Component Value Date    LABBLOO No Growth to date 12/29/2017    LABBLOO No Growth to date 12/29/2017       Pending Diagnostic Studies:     Procedure Component Value Units Date/Time    Troponin I #2 [[id number]] Collected:  12/29/17 0437    Order Status:  Sent Lab Status:  In process Updated:  12/29/17 0438    Specimen:  Blood from Blood          Medications:  Reconciled Home Medications:   Current Discharge Medication List      START taking these medications    Details   lisinopril (PRINIVIL,ZESTRIL) 5 MG tablet Take 1 tablet (5 mg total) by mouth once daily.  Qty: 90 tablet, Refills: 3      moxifloxacin (AVELOX) 400 mg tablet Take 1 tablet (400 mg total) by mouth once daily.  Qty: 3 tablet, Refills: 0      oseltamivir (TAMIFLU) 75 MG capsule Take 1 capsule (75 mg total) by mouth once daily.  Qty: 3 capsule, Refills: 0         CONTINUE these medications which have NOT CHANGED    Details   aspirin 81 MG Chew Take 81 mg by mouth once daily.      atorvastatin (LIPITOR) 20 MG tablet Take 20 mg by mouth once daily.      calcitRIOL (ROCALTROL) 0.25 MCG Cap Take 0.25 mcg by mouth once daily.      cinacalcet (SENSIPAR) 60 MG Tab Take 60 mg by mouth daily with breakfast.      DEXTROMETHORPHAN POLISTIREX (DELSYM 12 HOUR ORAL) Take by mouth.      diphenhydrAMINE (BENADRYL) 50 MG tablet Take 50 mg by mouth nightly as needed for " Itching.      fluticasone (FLONASE) 50 mcg/actuation nasal spray 1 spray by Each Nare route once daily.      furosemide (LASIX) 40 MG tablet Take 40 mg by mouth 2 (two) times daily.      metoprolol succinate (TOPROL-XL) 25 MG 24 hr tablet Take 25 mg by mouth once daily.      midodrine (PROAMATINE) 5 MG Tab Take 2.5 mg by mouth 2 (two) times daily with meals.      pantoprazole (PROTONIX) 40 MG tablet Take 40 mg by mouth once daily.      sevelamer HCl (RENAGEL) 800 MG Tab Take 800 mg by mouth 3 (three) times daily with meals.      temazepam (RESTORIL) 30 mg capsule Take 30 mg by mouth nightly as needed for Insomnia.             Indwelling Lines/Drains at time of discharge:   Lines/Drains/Airways     Drain                 Hemodialysis AV Fistula Left upper arm -- days                Time spent on the discharge of patient: 35 minutes  Patient was seen and examined on the date of discharge and determined to be suitable for discharge.         Kenya Montalvo MD  Department of Hospital Medicine  [facility]

## 2017-12-30 NOTE — PLAN OF CARE
Problem: Patient Care Overview  Goal: Plan of Care Review  Outcome: Ongoing (interventions implemented as appropriate)  Patient had a restful night, tmax 100.2, productive cough noted, patient completed dialysis, 3 liters off, remains sr on monitor, no compliants of pain, remains free from falls.  Will continue to monitor.

## 2017-12-30 NOTE — SUBJECTIVE & OBJECTIVE
Interval History: Pt was seen and examined. Remained stable last pm, feels better this am. Pt had HD yesterday, signed off early by 1 hour (had 3 hours of HD, had 3 L removed). Given admission for fluid gain and pulm edema, repeat HD was offered to the pt. Pt refused. Discussed with PCP. Pt possibly be discharged today. Has no new c/o's, no CP, no SOB, no fever, no cough.    Review of patient's allergies indicates:  No Known Allergies  Current Facility-Administered Medications   Medication Frequency    acetaminophen tablet 650 mg Q6H PRN    albumin human 25% bottle 12.5 g PRN    aspirin chewable tablet 81 mg Daily    atorvastatin tablet 20 mg QHS    calcitRIOL capsule 0.25 mcg Daily    cinacalcet tablet 60 mg Daily with breakfast    dextromethorphan-guaifenesin  mg per 12 hr tablet 1 tablet BID    dextrose 50% injection 12.5 g PRN    dextrose 50% injection 25 g PRN    diphenhydrAMINE capsule 50 mg Nightly PRN    glucagon (human recombinant) injection 1 mg PRN    glucose chewable tablet 16 g PRN    glucose chewable tablet 24 g PRN    heparin (porcine) injection 5,000 Units Q8H    insulin aspart pen 0-5 Units QID (AC + HS) PRN    lisinopril tablet 5 mg Daily    metoprolol succinate (TOPROL-XL) 24 hr tablet 25 mg Daily    moxifloxacin tablet 400 mg Daily    ondansetron injection 4 mg Q6H PRN    oseltamivir capsule 75 mg Daily    pantoprazole EC tablet 40 mg Daily    promethazine-codeine 6.25-10 mg/5 ml syrup 5 mL Q8H PRN    ramelteon tablet 8 mg Nightly PRN    sevelamer carbonate tablet 800 mg TID WM       Objective:     Vital Signs (Most Recent):  Temp: 98.2 °F (36.8 °C) (12/30/17 0736)  Pulse: 72 (12/30/17 1105)  Resp: 18 (12/30/17 0736)  BP: 125/62 (12/30/17 0736)  SpO2: 99 % (12/30/17 0912)  O2 Device (Oxygen Therapy): nasal cannula (12/30/17 0912) Vital Signs (24h Range):  Temp:  [98.2 °F (36.8 °C)-100.2 °F (37.9 °C)] 98.2 °F (36.8 °C)  Pulse:  [65-96] 72  Resp:  [18-20] 18  SpO2:   [95 %-99 %] 99 %  BP: ()/(56-72) 125/62     Weight: 101.6 kg (224 lb) (12/30/17 0339)  Body mass index is 35.08 kg/m².  Body surface area is 2.19 meters squared.    I/O last 3 completed shifts:  In: 660 [P.O.:360; IV Piggyback:300]  Out: 3520 [Urine:20; Other:3500]    Physical Exam   Constitutional: He is oriented to person, place, and time. He appears well-developed and well-nourished.   HENT:   Head: Normocephalic and atraumatic.   Neck: No JVD present.   Cardiovascular: Normal rate, regular rhythm and normal heart sounds.  Exam reveals no friction rub.    Pulmonary/Chest: Effort normal. He has rales.   Abdominal: Soft. Bowel sounds are normal.   Musculoskeletal: He exhibits edema.   Neurological: He is alert and oriented to person, place, and time.   Skin: Skin is warm and dry.   Psychiatric: He has a normal mood and affect. His behavior is normal.   Nursing note and vitals reviewed.      Significant Labs: reviewed  BMP  Lab Results   Component Value Date     (L) 12/30/2017     (L) 12/30/2017    K 3.9 12/30/2017    K 3.9 12/30/2017    CL 99 12/30/2017    CL 98 12/30/2017    CO2 26 12/30/2017    CO2 25 12/30/2017    BUN 21 12/30/2017    BUN 20 12/30/2017    CREATININE 6.4 (H) 12/30/2017    CREATININE 6.4 (H) 12/30/2017    CALCIUM 8.7 12/30/2017    CALCIUM 8.7 12/30/2017    ANIONGAP 9 12/30/2017    ANIONGAP 12 12/30/2017    ESTGFRAFRICA 9 (A) 12/30/2017    ESTGFRAFRICA 9 (A) 12/30/2017    EGFRNONAA 8 (A) 12/30/2017    EGFRNONAA 8 (A) 12/30/2017     Lab Results   Component Value Date    WBC 5.91 12/30/2017    HGB 8.7 (L) 12/30/2017    HCT 27.9 (L) 12/30/2017    MCV 95 12/30/2017     (L) 12/30/2017       Significant Imaging: CXR reviewed

## 2017-12-30 NOTE — PROGRESS NOTES
pts daughter arrived. AVS reviewed with pt and his daughter at this time. Both verbalized understanding. pts daughter to call and make fu apts. PIV remove, catheter remained in tact. Tele monitor removed. At this time pt is waiting on [place] [id number]

## 2017-12-30 NOTE — ASSESSMENT & PLAN NOTE
ESRD (end stage renal disease) on HD     ESRD: chronic HD q MWF.  K normal  Acid base stable  Pt has fluid overload, pulmonary edema  Providing HD today for UF  Has been stable on HD, has not missed  SOB due to pulm edema  SOB is also due to influenza A          * Acute on chronic systolic HFrEF of 20%     Acute on chronic CHF.  Systolic CHF with EF 25%  Providing HD for UF today  Pt was advised on salt (2-3 g/day) and fluid restriction (1.5 L/day)                     Essential hypertension     HTN: BP controlled.  Meds reviewed       Influenza A     Positive influenza A.  On tamiflu: adjust dose to 75 mg po qd (not bid) for renal dose adjustment.       Plans and recommendations:  As discussed above  Total time spent 70 minutes including time needed to review the records, the   patient evaluation, documentation, face-to-face discussion with the patient,   more than 50% of the time was spent on coordination of care and counseling.    Level V visit.  Pt received multiple visits and evaluations.

## 2017-12-30 NOTE — PROGRESS NOTES
Per respiratory pt qualifies for home 02 based off yesterdays evaluation. SW consult placed for home 02 arrangement

## 2017-12-30 NOTE — PLAN OF CARE
Problem: Patient Care Overview  Goal: Plan of Care Review  Outcome: Ongoing (interventions implemented as appropriate)  Pt tolerates txs and oxygen therapy well; no resp distress noted.

## 2017-12-30 NOTE — PROGRESS NOTES
Pts daughter states the pt has been on tamniflu since Wednesday of this week. Will ask primary MD to DC isolation per protocol

## 2017-12-31 NOTE — PROGRESS NOTES
pts 02 arrived. Viola [facility]EN[facility]UM representative to pts room to show pt 02 administration. At this time AVS previously reviewed as well as removal of tele monitor and PIV. PCT wheeled pt down to waiting car at this time. No acute distress noted to pt as wheeled off unit

## 2018-01-02 LAB
BACTERIA SPEC AEROBE CULT: NORMAL
GRAM STN SPEC: NORMAL

## 2018-01-03 LAB
BACTERIA BLD CULT: NORMAL
BACTERIA BLD CULT: NORMAL
POCT GLUCOSE: 138 MG/DL (ref 70–110)

## 2018-07-30 RX ORDER — CINACALCET HYDROCHLORIDE 60 MG/1
TABLET, COATED ORAL
Qty: 30 TABLET | Refills: 9 | Status: SHIPPED | OUTPATIENT
Start: 2018-07-30

## 2018-09-05 ENCOUNTER — TELEPHONE (OUTPATIENT)
Dept: NEPHROLOGY | Facility: CLINIC | Age: 73
End: 2018-09-05

## 2018-09-05 ENCOUNTER — DOCUMENTATION ONLY (OUTPATIENT)
Dept: NEPHROLOGY | Facility: HOSPITAL | Age: 73
End: 2018-09-05